# Patient Record
Sex: FEMALE | Race: ASIAN | NOT HISPANIC OR LATINO | URBAN - METROPOLITAN AREA
[De-identification: names, ages, dates, MRNs, and addresses within clinical notes are randomized per-mention and may not be internally consistent; named-entity substitution may affect disease eponyms.]

---

## 2019-05-08 ENCOUNTER — INPATIENT (INPATIENT)
Facility: HOSPITAL | Age: 24
LOS: 1 days | Discharge: HOME | End: 2019-05-10
Attending: OBSTETRICS & GYNECOLOGY | Admitting: OBSTETRICS & GYNECOLOGY
Payer: MEDICAID

## 2019-05-08 VITALS
RESPIRATION RATE: 16 BRPM | HEART RATE: 105 BPM | SYSTOLIC BLOOD PRESSURE: 122 MMHG | DIASTOLIC BLOOD PRESSURE: 65 MMHG | OXYGEN SATURATION: 100 % | TEMPERATURE: 98 F

## 2019-05-08 DIAGNOSIS — Z3A.00 WEEKS OF GESTATION OF PREGNANCY NOT SPECIFIED: ICD-10-CM

## 2019-05-08 DIAGNOSIS — Z34.80 ENCOUNTER FOR SUPERVISION OF OTHER NORMAL PREGNANCY, UNSPECIFIED TRIMESTER: ICD-10-CM

## 2019-05-08 DIAGNOSIS — O26.899 OTHER SPECIFIED PREGNANCY RELATED CONDITIONS, UNSPECIFIED TRIMESTER: ICD-10-CM

## 2019-05-08 LAB
APTT BLD: 27.2 SEC — LOW (ref 27.5–36.3)
BASOPHILS # BLD AUTO: 0.03 K/UL — SIGNIFICANT CHANGE UP (ref 0–0.2)
BASOPHILS NFR BLD AUTO: 0.2 % — SIGNIFICANT CHANGE UP (ref 0–2)
EOSINOPHIL # BLD AUTO: 0.03 K/UL — SIGNIFICANT CHANGE UP (ref 0–0.5)
EOSINOPHIL NFR BLD AUTO: 0.2 % — SIGNIFICANT CHANGE UP (ref 0–6)
FIBRINOGEN PPP-MCNC: 517 MG/DL — HIGH (ref 350–510)
HCT VFR BLD CALC: 34.6 % — SIGNIFICANT CHANGE UP (ref 34.5–45)
HGB BLD-MCNC: 10.3 G/DL — LOW (ref 11.5–15.5)
IMM GRANULOCYTES NFR BLD AUTO: 0.4 % — SIGNIFICANT CHANGE UP (ref 0–1.5)
INR BLD: 0.92 RATIO — SIGNIFICANT CHANGE UP (ref 0.88–1.16)
LYMPHOCYTES # BLD AUTO: 2.55 K/UL — SIGNIFICANT CHANGE UP (ref 1–3.3)
LYMPHOCYTES # BLD AUTO: 21 % — SIGNIFICANT CHANGE UP (ref 13–44)
MCHC RBC-ENTMCNC: 22.1 PG — LOW (ref 27–34)
MCHC RBC-ENTMCNC: 29.8 GM/DL — LOW (ref 32–36)
MCV RBC AUTO: 74.1 FL — LOW (ref 80–100)
MONOCYTES # BLD AUTO: 0.71 K/UL — SIGNIFICANT CHANGE UP (ref 0–0.9)
MONOCYTES NFR BLD AUTO: 5.9 % — SIGNIFICANT CHANGE UP (ref 2–14)
NEUTROPHILS # BLD AUTO: 8.76 K/UL — HIGH (ref 1.8–7.4)
NEUTROPHILS NFR BLD AUTO: 72.3 % — SIGNIFICANT CHANGE UP (ref 43–77)
NRBC # BLD: 0 /100 WBCS — SIGNIFICANT CHANGE UP (ref 0–0)
PLATELET # BLD AUTO: 304 K/UL — SIGNIFICANT CHANGE UP (ref 150–400)
PROTHROM AB SERPL-ACNC: 10.2 SEC — SIGNIFICANT CHANGE UP (ref 10–12.9)
RBC # BLD: 4.67 M/UL — SIGNIFICANT CHANGE UP (ref 3.8–5.2)
RBC # FLD: 17.6 % — HIGH (ref 10.3–14.5)
WBC # BLD: 12.13 K/UL — HIGH (ref 3.8–10.5)
WBC # FLD AUTO: 12.13 K/UL — HIGH (ref 3.8–10.5)

## 2019-05-08 PROCEDURE — 99282 EMERGENCY DEPT VISIT SF MDM: CPT

## 2019-05-08 RX ORDER — TETANUS TOXOID, REDUCED DIPHTHERIA TOXOID AND ACELLULAR PERTUSSIS VACCINE, ADSORBED 5; 2.5; 8; 8; 2.5 [IU]/.5ML; [IU]/.5ML; UG/.5ML; UG/.5ML; UG/.5ML
0.5 SUSPENSION INTRAMUSCULAR ONCE
Qty: 0 | Refills: 0 | Status: DISCONTINUED | OUTPATIENT
Start: 2019-05-08 | End: 2019-05-10

## 2019-05-08 RX ORDER — CITRIC ACID/SODIUM CITRATE 300-500 MG
15 SOLUTION, ORAL ORAL EVERY 6 HOURS
Qty: 0 | Refills: 0 | Status: DISCONTINUED | OUTPATIENT
Start: 2019-05-08 | End: 2019-05-08

## 2019-05-08 RX ORDER — SODIUM CHLORIDE 9 MG/ML
1000 INJECTION, SOLUTION INTRAVENOUS
Qty: 0 | Refills: 0 | Status: DISCONTINUED | OUTPATIENT
Start: 2019-05-08 | End: 2019-05-08

## 2019-05-08 RX ORDER — DOCUSATE SODIUM 100 MG
100 CAPSULE ORAL
Qty: 0 | Refills: 0 | Status: DISCONTINUED | OUTPATIENT
Start: 2019-05-08 | End: 2019-05-10

## 2019-05-08 RX ORDER — HYDROCORTISONE 1 %
1 OINTMENT (GRAM) TOPICAL EVERY 6 HOURS
Qty: 0 | Refills: 0 | Status: DISCONTINUED | OUTPATIENT
Start: 2019-05-08 | End: 2019-05-10

## 2019-05-08 RX ORDER — LANOLIN
1 OINTMENT (GRAM) TOPICAL EVERY 6 HOURS
Qty: 0 | Refills: 0 | Status: DISCONTINUED | OUTPATIENT
Start: 2019-05-08 | End: 2019-05-10

## 2019-05-08 RX ORDER — SODIUM CHLORIDE 9 MG/ML
3 INJECTION INTRAMUSCULAR; INTRAVENOUS; SUBCUTANEOUS EVERY 8 HOURS
Qty: 0 | Refills: 0 | Status: DISCONTINUED | OUTPATIENT
Start: 2019-05-08 | End: 2019-05-10

## 2019-05-08 RX ORDER — IBUPROFEN 200 MG
600 TABLET ORAL EVERY 6 HOURS
Qty: 0 | Refills: 0 | Status: DISCONTINUED | OUTPATIENT
Start: 2019-05-08 | End: 2019-05-10

## 2019-05-08 RX ORDER — SIMETHICONE 80 MG/1
80 TABLET, CHEWABLE ORAL EVERY 4 HOURS
Qty: 0 | Refills: 0 | Status: DISCONTINUED | OUTPATIENT
Start: 2019-05-08 | End: 2019-05-10

## 2019-05-08 RX ORDER — OXYTOCIN 10 UNIT/ML
333.33 VIAL (ML) INJECTION
Qty: 20 | Refills: 0 | Status: DISCONTINUED | OUTPATIENT
Start: 2019-05-08 | End: 2019-05-10

## 2019-05-08 RX ORDER — DIBUCAINE 1 %
1 OINTMENT (GRAM) RECTAL EVERY 6 HOURS
Qty: 0 | Refills: 0 | Status: DISCONTINUED | OUTPATIENT
Start: 2019-05-08 | End: 2019-05-10

## 2019-05-08 RX ORDER — OXYCODONE HYDROCHLORIDE 5 MG/1
5 TABLET ORAL EVERY 4 HOURS
Qty: 0 | Refills: 0 | Status: DISCONTINUED | OUTPATIENT
Start: 2019-05-08 | End: 2019-05-10

## 2019-05-08 RX ORDER — MAGNESIUM HYDROXIDE 400 MG/1
30 TABLET, CHEWABLE ORAL
Qty: 0 | Refills: 0 | Status: DISCONTINUED | OUTPATIENT
Start: 2019-05-08 | End: 2019-05-10

## 2019-05-08 RX ORDER — OXYTOCIN 10 UNIT/ML
333.33 VIAL (ML) INJECTION
Qty: 20 | Refills: 0 | Status: DISCONTINUED | OUTPATIENT
Start: 2019-05-08 | End: 2019-05-08

## 2019-05-08 RX ORDER — GLYCERIN ADULT
1 SUPPOSITORY, RECTAL RECTAL AT BEDTIME
Qty: 0 | Refills: 0 | Status: DISCONTINUED | OUTPATIENT
Start: 2019-05-08 | End: 2019-05-10

## 2019-05-08 RX ORDER — AER TRAVELER 0.5 G/1
1 SOLUTION RECTAL; TOPICAL EVERY 4 HOURS
Qty: 0 | Refills: 0 | Status: DISCONTINUED | OUTPATIENT
Start: 2019-05-08 | End: 2019-05-10

## 2019-05-08 RX ORDER — PRAMOXINE HYDROCHLORIDE 150 MG/15G
1 AEROSOL, FOAM RECTAL EVERY 4 HOURS
Qty: 0 | Refills: 0 | Status: DISCONTINUED | OUTPATIENT
Start: 2019-05-08 | End: 2019-05-10

## 2019-05-08 RX ORDER — BENZOCAINE 10 %
1 GEL (GRAM) MUCOUS MEMBRANE EVERY 6 HOURS
Qty: 0 | Refills: 0 | Status: DISCONTINUED | OUTPATIENT
Start: 2019-05-08 | End: 2019-05-10

## 2019-05-08 RX ORDER — DIPHENHYDRAMINE HCL 50 MG
25 CAPSULE ORAL EVERY 6 HOURS
Qty: 0 | Refills: 0 | Status: DISCONTINUED | OUTPATIENT
Start: 2019-05-08 | End: 2019-05-10

## 2019-05-08 RX ADMIN — Medication 600 MILLIGRAM(S): at 17:56

## 2019-05-08 RX ADMIN — SIMETHICONE 80 MILLIGRAM(S): 80 TABLET, CHEWABLE ORAL at 22:44

## 2019-05-08 RX ADMIN — Medication 600 MILLIGRAM(S): at 17:00

## 2019-05-08 RX ADMIN — Medication 600 MILLIGRAM(S): at 22:45

## 2019-05-08 RX ADMIN — SODIUM CHLORIDE 3 MILLILITER(S): 9 INJECTION INTRAMUSCULAR; INTRAVENOUS; SUBCUTANEOUS at 22:08

## 2019-05-08 RX ADMIN — Medication 100 MILLIGRAM(S): at 22:45

## 2019-05-08 RX ADMIN — Medication 1000 MILLIUNIT(S)/MIN: at 17:20

## 2019-05-09 DIAGNOSIS — D50.0 IRON DEFICIENCY ANEMIA SECONDARY TO BLOOD LOSS (CHRONIC): ICD-10-CM

## 2019-05-09 LAB
ABO RH CONFIRMATION: SIGNIFICANT CHANGE UP
BASOPHILS # BLD AUTO: 0.02 K/UL — SIGNIFICANT CHANGE UP (ref 0–0.2)
BASOPHILS # BLD AUTO: 0.04 K/UL — SIGNIFICANT CHANGE UP (ref 0–0.2)
BASOPHILS NFR BLD AUTO: 0.2 % — SIGNIFICANT CHANGE UP (ref 0–2)
BASOPHILS NFR BLD AUTO: 0.3 % — SIGNIFICANT CHANGE UP (ref 0–2)
EOSINOPHIL # BLD AUTO: 0.01 K/UL — SIGNIFICANT CHANGE UP (ref 0–0.5)
EOSINOPHIL # BLD AUTO: 0.1 K/UL — SIGNIFICANT CHANGE UP (ref 0–0.5)
EOSINOPHIL NFR BLD AUTO: 0.1 % — SIGNIFICANT CHANGE UP (ref 0–6)
EOSINOPHIL NFR BLD AUTO: 0.8 % — SIGNIFICANT CHANGE UP (ref 0–6)
HCT VFR BLD CALC: 23.2 % — LOW (ref 34.5–45)
HCT VFR BLD CALC: 31.6 % — LOW (ref 34.5–45)
HGB BLD-MCNC: 6.9 G/DL — CRITICAL LOW (ref 11.5–15.5)
HGB BLD-MCNC: 9.9 G/DL — LOW (ref 11.5–15.5)
IMM GRANULOCYTES NFR BLD AUTO: 0.4 % — SIGNIFICANT CHANGE UP (ref 0–1.5)
IMM GRANULOCYTES NFR BLD AUTO: 1.2 % — SIGNIFICANT CHANGE UP (ref 0–1.5)
LYMPHOCYTES # BLD AUTO: 19.1 % — SIGNIFICANT CHANGE UP (ref 13–44)
LYMPHOCYTES # BLD AUTO: 2.05 K/UL — SIGNIFICANT CHANGE UP (ref 1–3.3)
LYMPHOCYTES # BLD AUTO: 2.6 K/UL — SIGNIFICANT CHANGE UP (ref 1–3.3)
LYMPHOCYTES # BLD AUTO: 21.9 % — SIGNIFICANT CHANGE UP (ref 13–44)
MCHC RBC-ENTMCNC: 22.3 PG — LOW (ref 27–34)
MCHC RBC-ENTMCNC: 23.6 PG — LOW (ref 27–34)
MCHC RBC-ENTMCNC: 29.7 GM/DL — LOW (ref 32–36)
MCHC RBC-ENTMCNC: 31.3 GM/DL — LOW (ref 32–36)
MCV RBC AUTO: 74.8 FL — LOW (ref 80–100)
MCV RBC AUTO: 75.4 FL — LOW (ref 80–100)
MONOCYTES # BLD AUTO: 0.76 K/UL — SIGNIFICANT CHANGE UP (ref 0–0.9)
MONOCYTES # BLD AUTO: 0.93 K/UL — HIGH (ref 0–0.9)
MONOCYTES NFR BLD AUTO: 7.1 % — SIGNIFICANT CHANGE UP (ref 2–14)
MONOCYTES NFR BLD AUTO: 7.8 % — SIGNIFICANT CHANGE UP (ref 2–14)
NEUTROPHILS # BLD AUTO: 7.88 K/UL — HIGH (ref 1.8–7.4)
NEUTROPHILS # BLD AUTO: 8.05 K/UL — HIGH (ref 1.8–7.4)
NEUTROPHILS NFR BLD AUTO: 68 % — SIGNIFICANT CHANGE UP (ref 43–77)
NEUTROPHILS NFR BLD AUTO: 73.1 % — SIGNIFICANT CHANGE UP (ref 43–77)
NRBC # BLD: 0 /100 WBCS — SIGNIFICANT CHANGE UP (ref 0–0)
NRBC # BLD: 0 /100 WBCS — SIGNIFICANT CHANGE UP (ref 0–0)
PLATELET # BLD AUTO: 252 K/UL — SIGNIFICANT CHANGE UP (ref 150–400)
PLATELET # BLD AUTO: 300 K/UL — SIGNIFICANT CHANGE UP (ref 150–400)
RBC # BLD: 3.1 M/UL — LOW (ref 3.8–5.2)
RBC # BLD: 4.19 M/UL — SIGNIFICANT CHANGE UP (ref 3.8–5.2)
RBC # FLD: 17.4 % — HIGH (ref 10.3–14.5)
RBC # FLD: 19.3 % — HIGH (ref 10.3–14.5)
T PALLIDUM AB TITR SER: NEGATIVE — SIGNIFICANT CHANGE UP
WBC # BLD: 10.76 K/UL — HIGH (ref 3.8–10.5)
WBC # BLD: 11.86 K/UL — HIGH (ref 3.8–10.5)
WBC # FLD AUTO: 10.76 K/UL — HIGH (ref 3.8–10.5)
WBC # FLD AUTO: 11.86 K/UL — HIGH (ref 3.8–10.5)

## 2019-05-09 RX ORDER — FERROUS SULFATE 325(65) MG
325 TABLET ORAL THREE TIMES A DAY
Refills: 0 | Status: DISCONTINUED | OUTPATIENT
Start: 2019-05-09 | End: 2019-05-10

## 2019-05-09 RX ORDER — ASCORBIC ACID 60 MG
500 TABLET,CHEWABLE ORAL DAILY
Refills: 0 | Status: DISCONTINUED | OUTPATIENT
Start: 2019-05-09 | End: 2019-05-10

## 2019-05-09 RX ADMIN — Medication 325 MILLIGRAM(S): at 22:20

## 2019-05-09 RX ADMIN — MAGNESIUM HYDROXIDE 30 MILLILITER(S): 400 TABLET, CHEWABLE ORAL at 06:29

## 2019-05-09 RX ADMIN — Medication 500 MILLIGRAM(S): at 13:36

## 2019-05-09 RX ADMIN — SIMETHICONE 80 MILLIGRAM(S): 80 TABLET, CHEWABLE ORAL at 06:28

## 2019-05-09 RX ADMIN — Medication 325 MILLIGRAM(S): at 13:37

## 2019-05-09 RX ADMIN — Medication 1 TABLET(S): at 13:36

## 2019-05-09 RX ADMIN — SODIUM CHLORIDE 3 MILLILITER(S): 9 INJECTION INTRAMUSCULAR; INTRAVENOUS; SUBCUTANEOUS at 06:30

## 2019-05-09 RX ADMIN — Medication 100 MILLIGRAM(S): at 06:29

## 2019-05-09 RX ADMIN — SODIUM CHLORIDE 3 MILLILITER(S): 9 INJECTION INTRAMUSCULAR; INTRAVENOUS; SUBCUTANEOUS at 13:38

## 2019-05-09 RX ADMIN — Medication 600 MILLIGRAM(S): at 22:52

## 2019-05-09 RX ADMIN — Medication 600 MILLIGRAM(S): at 06:29

## 2019-05-09 RX ADMIN — Medication 600 MILLIGRAM(S): at 13:37

## 2019-05-09 RX ADMIN — Medication 600 MILLIGRAM(S): at 14:07

## 2019-05-09 RX ADMIN — SODIUM CHLORIDE 3 MILLILITER(S): 9 INJECTION INTRAMUSCULAR; INTRAVENOUS; SUBCUTANEOUS at 22:22

## 2019-05-09 RX ADMIN — Medication 600 MILLIGRAM(S): at 22:22

## 2019-05-09 NOTE — DISCHARGE NOTE OB - MEDICATION SUMMARY - MEDICATIONS TO TAKE
I will START or STAY ON the medications listed below when I get home from the hospital:    ibuprofen 600 mg oral tablet  -- 1 tab(s) by mouth every 6 hours, As needed, Pain   -- Indication: For pain    Prenatal Multivitamins with Folic Acid 1 mg oral tablet  -- 1 tab(s) by mouth once a day  -- Indication: For Supplmentation while breast feeding    ferrous sulfate 325 mg (65 mg elemental iron) oral tablet  -- 1 tab(s) by mouth 2 times a day   -- Check with your doctor before becoming pregnant.  Do not chew, break, or crush.  May discolor urine or feces.    -- Indication: For Supplmentation    docusate sodium 100 mg oral capsule  -- 1 cap(s) by mouth 2 times a day, As needed, For stool softening  -- Indication: For Stool softening     Vitamin C 500 mg oral tablet, chewable  -- 1 tab(s) chewed once a day   -- Chew tablets before swallowing    -- Indication: For Supplementation

## 2019-05-09 NOTE — DISCHARGE NOTE OB - MATERIALS PROVIDED
Guide to Postpartum Care/Birth Certificate Instructions/Discharge Medication Information for Patients and Families Pocket Guide/  Immunization Record/Shaken Baby Prevention Handout/Breastfeeding Guide and Packet/Vaccinations

## 2019-05-09 NOTE — PROGRESS NOTE ADULT - SUBJECTIVE AND OBJECTIVE BOX
Patient seen at bedside resting comfortably offers no current complaints. Ambulating and voiding without difficulty.  Passing flatus and tolerating regular diet.  both breast/bottle feeding . Denies HA, CP, SOB, N/V/D, dizziness, palpitations, worsening abdominal pain, worsening vaginal bleeding, or any other concerns.    Vital Signs Last 24 Hrs  T(C): 36.4 (09 May 2019 09:08), Max: 36.8 (08 May 2019 15:45)  T(F): 97.5 (09 May 2019 09:08), Max: 98.2 (08 May 2019 15:45)  HR: 102 (09 May 2019 09:08) (66 - 105)  BP: 125/72 (09 May 2019 09:08) (92/52 - 125/72)  BP(mean): --  RR: 16 (09 May 2019 09:08) (16 - 22)  SpO2: 99% (09 May 2019 09:08) (99% - 100%)    Physical Exam:     Gen: A&Ox 3, NAD  Chest: CTA B/L  Cardiac: S1+S2; RRR  Breast: Soft, nontender, nonengorged  Abdomen: +Bs; Soft, nontender,  ND; Fundus firm below umbilicus  Gyn: mod lochia, intact/repaired  Ext: Nontender, DTRS 2+, no worsening edema                          6.9    10.76 )-----------( 252      ( 09 May 2019 06:11 )             23.2       A/P: 23 year old PPD#1 s/p , acute blood loss anemia    -orthostatics positive  -transfusing 2units PRBC   -Continue pain management  -Encourage OOB and ambulation  -Check post transfusion CBC  -Continue current care  -Plan for discharge tomorrow  -d/w dr. johnson

## 2019-05-09 NOTE — DISCHARGE NOTE OB - CARE PLAN
Principal Discharge DX:	Normal spontaneous vaginal delivery  Goal:	pain management, encourage breast feeding  Assessment and plan of treatment:	Continue prenatal vitamins while breastfeeding. Pelvic rest.  No sexual intercourse and  nothing in vagina - tampons, douching.  No heavy lifting or no strenuous activity.  Motrin as needed for pain. Follow up in office in 5-6 weeks for post partum check up. Please call for appt.  Secondary Diagnosis:	Anemia due to blood loss  Goal:	s/p 2 units prbcs  Assessment and plan of treatment:	take iron, folic acid, vitamin C, and prenatal vitamins. eat iron fortified food. Please take iron tablets three times daily. Return if any dizziness, shortness of breath, palpitations, chest pain or any other acute symptoms.

## 2019-05-09 NOTE — PROGRESS NOTE ADULT - PROBLEM SELECTOR PLAN 2
-orthostatics positive  -transfusing 2units PRBC   -Continue pain management  -Encourage OOB and ambulation  -Check post transfusion CBC  -Continue current care  -Plan for discharge tomorrow  -d/w dr. johnson

## 2019-05-09 NOTE — DISCHARGE NOTE OB - HOSPITAL COURSE
patient admitted in labor  normal spontaneous vaginal delivery  acute blood loss anemia noted  2 unit prbcs given  patient discharged home in stable condition

## 2019-05-09 NOTE — DISCHARGE NOTE OB - CARE PROVIDER_API CALL
Mervin Villanueva (DO)  Obstetrics  Gynecology  [address], [address]  [place] [address]  Phone: [phone]  Fax: [phone]  Follow Up Time:

## 2019-05-09 NOTE — DISCHARGE NOTE OB - ADDITIONAL INSTRUCTIONS
Continue prenatal vitamins while breastfeeding. Pelvic rest.  No sexual intercourse and  nothing in vagina - tampons, douching.  No heavy lifting or no strenuous activity.  Motrin as needed for pain. Follow up in office in 5-6 weeks for post partum check up. Please call for appt.  take iron, folic acid, vitamin C, and prenatal vitamins. eat iron fortified food. Please take iron tablets three times daily. Return if any dizziness, shortness of breath, palpitations, chest pain or any other acute symptoms.

## 2019-05-09 NOTE — CHART NOTE - NSCHARTNOTEFT_GEN_A_CORE
Lab notified of low H:H   Complete Blood Count + Automated Diff in AM (19 @ 06:11)    WBC Count: 10.76 K/uL    RBC Count: 3.10 M/uL    Hemoglobin: 6.9: TYPE:(C=Critical, N=Notification, A=Abnormal) C  TESTS: HGB  DATE/TIME CALLED: 19 06:35  CALLED TO: GENE LOUIS  READ BACK (2 Patient Identifiers)(Y/N): Y  READ BACK VALUES (Y/N): Y  CALLED BY: KERRI g/dL    Hematocrit: 23.2 %    Mean Cell Volume: 74.8 fl    Mean Cell Hemoglobin: 22.3 pg    Mean Cell Hemoglobin Conc: 29.7 gm/dL    Red Cell Distrib Width: 17.4 %    Platelet Count - Automated: 252 K/uL    Auto Neutrophil #: 7.88 K/uL    Auto Lymphocyte #: 2.05 K/uL    Auto Monocyte #: 0.76 K/uL    Auto Eosinophil #: 0.01 K/uL    Auto Basophil #: 0.02 K/uL    Auto Neutrophil %: 73.1: Differential percentages must be correlated with absolute numbers for  clinical significance. %    Auto Lymphocyte %: 19.1 %    Auto Monocyte %: 7.1 %    Auto Eosinophil %: 0.1 %    Auto Basophil %: 0.2 %    Auto Immature Granulocyte %: 0.4 %    Nucleated RBC: 0 /100 WBCs    Pt seen at bedside states feels dizzy upon ambulation, denies cp, sob or palpitations    orthostatic vs /62 P 83, /70 P 99, /97 P 110    Gen: A&O x3; NAD, pale appearing  card: RRR  abd: soft/nt, fundus firm  pelvic: mod lochia    a/p s/p PPD#1 s/p  w chronic on acute blood loss anemia; symptomatic  2units PRBC ordered  cont pp care  consent obtained pt understands and agrees w plan  d/w dr Villanueva

## 2019-05-09 NOTE — DISCHARGE NOTE OB - PATIENT PORTAL LINK FT
You can access the [facility] Patient Portal, offered by [facility], by registering with the following website: [email or web address]

## 2019-05-09 NOTE — DISCHARGE NOTE OB - PLAN OF CARE
pain management, encourage breast feeding Continue prenatal vitamins while breastfeeding. Pelvic rest.  No sexual intercourse and  nothing in vagina - tampons, douching.  No heavy lifting or no strenuous activity.  Motrin as needed for pain. Follow up in office in 5-6 weeks for post partum check up. Please call for appt. s/p 2 units prbcs take iron, folic acid, vitamin C, and prenatal vitamins. eat iron fortified food. Please take iron tablets three times daily. Return if any dizziness, shortness of breath, palpitations, chest pain or any other acute symptoms.

## 2019-05-10 VITALS
HEART RATE: 87 BPM | SYSTOLIC BLOOD PRESSURE: 128 MMHG | OXYGEN SATURATION: 98 % | TEMPERATURE: 98 F | RESPIRATION RATE: 18 BRPM | DIASTOLIC BLOOD PRESSURE: 75 MMHG

## 2019-05-10 LAB
BASOPHILS # BLD AUTO: 0.04 K/UL — SIGNIFICANT CHANGE UP (ref 0–0.2)
BASOPHILS NFR BLD AUTO: 0.4 % — SIGNIFICANT CHANGE UP (ref 0–2)
EOSINOPHIL # BLD AUTO: 0.28 K/UL — SIGNIFICANT CHANGE UP (ref 0–0.5)
EOSINOPHIL NFR BLD AUTO: 2.6 % — SIGNIFICANT CHANGE UP (ref 0–6)
HCT VFR BLD CALC: 32 % — LOW (ref 34.5–45)
HGB BLD-MCNC: 10.1 G/DL — LOW (ref 11.5–15.5)
IMM GRANULOCYTES NFR BLD AUTO: 1 % — SIGNIFICANT CHANGE UP (ref 0–1.5)
LYMPHOCYTES # BLD AUTO: 2.58 K/UL — SIGNIFICANT CHANGE UP (ref 1–3.3)
LYMPHOCYTES # BLD AUTO: 24 % — SIGNIFICANT CHANGE UP (ref 13–44)
MCHC RBC-ENTMCNC: 23.8 PG — LOW (ref 27–34)
MCHC RBC-ENTMCNC: 31.6 GM/DL — LOW (ref 32–36)
MCV RBC AUTO: 75.3 FL — LOW (ref 80–100)
MONOCYTES # BLD AUTO: 0.8 K/UL — SIGNIFICANT CHANGE UP (ref 0–0.9)
MONOCYTES NFR BLD AUTO: 7.5 % — SIGNIFICANT CHANGE UP (ref 2–14)
NEUTROPHILS # BLD AUTO: 6.92 K/UL — SIGNIFICANT CHANGE UP (ref 1.8–7.4)
NEUTROPHILS NFR BLD AUTO: 64.5 % — SIGNIFICANT CHANGE UP (ref 43–77)
NRBC # BLD: 0 /100 WBCS — SIGNIFICANT CHANGE UP (ref 0–0)
PLATELET # BLD AUTO: 310 K/UL — SIGNIFICANT CHANGE UP (ref 150–400)
RBC # BLD: 4.25 M/UL — SIGNIFICANT CHANGE UP (ref 3.8–5.2)
RBC # FLD: 19 % — HIGH (ref 10.3–14.5)
WBC # BLD: 10.73 K/UL — HIGH (ref 3.8–10.5)
WBC # FLD AUTO: 10.73 K/UL — HIGH (ref 3.8–10.5)

## 2019-05-10 RX ORDER — IBUPROFEN 200 MG
1 TABLET ORAL
Qty: 20 | Refills: 0
Start: 2019-05-10 | End: 2019-05-14

## 2019-05-10 RX ORDER — FERROUS SULFATE 325(65) MG
1 TABLET ORAL
Qty: 60 | Refills: 0
Start: 2019-05-10 | End: 2019-06-08

## 2019-05-10 RX ORDER — ASCORBIC ACID 60 MG
1 TABLET,CHEWABLE ORAL
Qty: 30 | Refills: 0
Start: 2019-05-10 | End: 2019-06-08

## 2019-05-10 RX ORDER — DOCUSATE SODIUM 100 MG
1 CAPSULE ORAL
Qty: 60 | Refills: 0
Start: 2019-05-10 | End: 2019-06-08

## 2019-05-10 RX ADMIN — Medication 600 MILLIGRAM(S): at 06:32

## 2019-05-10 RX ADMIN — Medication 600 MILLIGRAM(S): at 11:55

## 2019-05-10 RX ADMIN — SODIUM CHLORIDE 3 MILLILITER(S): 9 INJECTION INTRAMUSCULAR; INTRAVENOUS; SUBCUTANEOUS at 06:08

## 2019-05-10 RX ADMIN — Medication 1 TABLET(S): at 11:54

## 2019-05-10 RX ADMIN — Medication 600 MILLIGRAM(S): at 12:03

## 2019-05-10 RX ADMIN — Medication 325 MILLIGRAM(S): at 06:09

## 2019-05-10 RX ADMIN — Medication 600 MILLIGRAM(S): at 06:12

## 2019-05-10 NOTE — PROGRESS NOTE ADULT - ASSESSMENT
A/P:  23y F Postpartum day two s/p  @ 39 weeks, acute blood loss anemia, s/p 2 units prbcs, currently in stable condition  -d/c home  -Discharge instructions given. Patient verbalize understanding  d/w Dr Villanueva

## 2019-05-10 NOTE — PROGRESS NOTE ADULT - SUBJECTIVE AND OBJECTIVE BOX
OBGYN PA NOTE PPD2  Patient is [employer] speaking. HARPER Trixie served as . Patient seen and evaluated at bedside,  resting comfortably w/o any acute  complaints.  Denies fever, HA, CP, SOB, N/V/D, dizziness, palpitations, worsening abdominal pain, worsening vaginal bleeding, or any other concerns.  Ambulating and voiding without difficulty.  Passing flatus and tolerating regular diet.  Attempting to breastfeed.     Vital Signs Last 24 Hrs  T(C): 36.6 (10 May 2019 04:45), Max: 37.1 (09 May 2019 14:10)  T(F): 97.9 (10 May 2019 04:45), Max: 98.7 (09 May 2019 14:10)  HR: 87 (10 May 2019 04:45) (79 - 105)  BP: 128/75 (10 May 2019 04:45) (108/62 - 128/75)  BP(mean): 89 (09 May 2019 17:37) (89 - 89)  RR: 18 (10 May 2019 04:45) (16 - 18)  SpO2: 98% (10 May 2019 04:45) (96% - 99%)    Physical Exam:     Gen: A&Ox 3, NAD  Chest: CTAB/L  Cardiac: S1+S2+ RRR  Breast: Soft, nontender, nonengorged  Abdomen: Soft, nontender, Fundus firm below umbilicus, +BS  Gyn: Mild lochia, intact/repaired  Extremities: Nontender, DTRS 2+, no worsening edema                          10.1   10.73 )-----------( 310      ( 10 May 2019 06:50 )             32.0     A/P:  23y F Postpartum day two s/p  @ 39 weeks, acute blood loss anemia, s/p 2 units prbcs, currently in stable condition  -d/c home  -Discharge instructions given. Patient verbalize understanding  d/w Dr Villanueva

## 2019-07-10 PROCEDURE — 86923 COMPATIBILITY TEST ELECTRIC: CPT

## 2019-07-10 PROCEDURE — 36430 TRANSFUSION BLD/BLD COMPNT: CPT

## 2019-07-10 PROCEDURE — 59050 FETAL MONITOR W/REPORT: CPT

## 2019-07-10 PROCEDURE — 85610 PROTHROMBIN TIME: CPT

## 2019-07-10 PROCEDURE — P9040: CPT

## 2019-07-10 PROCEDURE — 85384 FIBRINOGEN ACTIVITY: CPT

## 2019-07-10 PROCEDURE — 85027 COMPLETE CBC AUTOMATED: CPT

## 2019-07-10 PROCEDURE — 86901 BLOOD TYPING SEROLOGIC RH(D): CPT

## 2019-07-10 PROCEDURE — 85730 THROMBOPLASTIN TIME PARTIAL: CPT

## 2019-07-10 PROCEDURE — 86780 TREPONEMA PALLIDUM: CPT

## 2019-07-10 PROCEDURE — 36415 COLL VENOUS BLD VENIPUNCTURE: CPT

## 2019-07-10 PROCEDURE — 59025 FETAL NON-STRESS TEST: CPT

## 2019-07-10 PROCEDURE — 86850 RBC ANTIBODY SCREEN: CPT

## 2019-07-10 PROCEDURE — 86900 BLOOD TYPING SEROLOGIC ABO: CPT

## 2019-07-10 PROCEDURE — G0463: CPT

## 2020-08-01 ENCOUNTER — INPATIENT (INPATIENT)
Facility: HOSPITAL | Age: 25
LOS: 3 days | Discharge: HOME | DRG: 872 | End: 2020-08-05
Attending: INTERNAL MEDICINE | Admitting: INTERNAL MEDICINE
Payer: MEDICAID

## 2020-08-01 VITALS
WEIGHT: 105.82 LBS | SYSTOLIC BLOOD PRESSURE: 103 MMHG | RESPIRATION RATE: 16 BRPM | TEMPERATURE: 103 F | HEART RATE: 101 BPM | DIASTOLIC BLOOD PRESSURE: 78 MMHG | OXYGEN SATURATION: 99 % | HEIGHT: 62 IN

## 2020-08-01 DIAGNOSIS — N12 TUBULO-INTERSTITIAL NEPHRITIS, NOT SPECIFIED AS ACUTE OR CHRONIC: ICD-10-CM

## 2020-08-01 PROBLEM — Z78.9 OTHER SPECIFIED HEALTH STATUS: Chronic | Status: ACTIVE | Noted: 2019-05-08

## 2020-08-01 LAB
ALBUMIN SERPL ELPH-MCNC: 2.9 G/DL — LOW (ref 3.5–5)
ALP SERPL-CCNC: 161 U/L — HIGH (ref 40–120)
ALT FLD-CCNC: 56 U/L DA — SIGNIFICANT CHANGE UP (ref 10–60)
ANION GAP SERPL CALC-SCNC: 8 MMOL/L — SIGNIFICANT CHANGE UP (ref 5–17)
APPEARANCE UR: CLEAR — SIGNIFICANT CHANGE UP
APTT BLD: 35.6 SEC — HIGH (ref 27.5–35.5)
AST SERPL-CCNC: 37 U/L — SIGNIFICANT CHANGE UP (ref 10–40)
BASOPHILS # BLD AUTO: 0.01 K/UL — SIGNIFICANT CHANGE UP (ref 0–0.2)
BASOPHILS NFR BLD AUTO: 0.2 % — SIGNIFICANT CHANGE UP (ref 0–2)
BILIRUB SERPL-MCNC: 0.5 MG/DL — SIGNIFICANT CHANGE UP (ref 0.2–1.2)
BILIRUB UR-MCNC: NEGATIVE — SIGNIFICANT CHANGE UP
BUN SERPL-MCNC: 5 MG/DL — LOW (ref 7–18)
CALCIUM SERPL-MCNC: 8.2 MG/DL — LOW (ref 8.4–10.5)
CHLORIDE SERPL-SCNC: 103 MMOL/L — SIGNIFICANT CHANGE UP (ref 96–108)
CO2 SERPL-SCNC: 26 MMOL/L — SIGNIFICANT CHANGE UP (ref 22–31)
COLOR SPEC: YELLOW — SIGNIFICANT CHANGE UP
CREAT SERPL-MCNC: 0.55 MG/DL — SIGNIFICANT CHANGE UP (ref 0.5–1.3)
DIFF PNL FLD: ABNORMAL
EOSINOPHIL # BLD AUTO: 0 K/UL — SIGNIFICANT CHANGE UP (ref 0–0.5)
EOSINOPHIL NFR BLD AUTO: 0 % — SIGNIFICANT CHANGE UP (ref 0–6)
GLUCOSE SERPL-MCNC: 104 MG/DL — HIGH (ref 70–99)
GLUCOSE UR QL: NEGATIVE — SIGNIFICANT CHANGE UP
HCG SERPL-ACNC: <1 MIU/ML — SIGNIFICANT CHANGE UP
HCT VFR BLD CALC: 30.6 % — LOW (ref 34.5–45)
HGB BLD-MCNC: 9.3 G/DL — LOW (ref 11.5–15.5)
IMM GRANULOCYTES NFR BLD AUTO: 0.4 % — SIGNIFICANT CHANGE UP (ref 0–1.5)
INR BLD: 1.22 RATIO — HIGH (ref 0.88–1.16)
KETONES UR-MCNC: NEGATIVE — SIGNIFICANT CHANGE UP
LACTATE SERPL-SCNC: 1.1 MMOL/L — SIGNIFICANT CHANGE UP (ref 0.7–2)
LEUKOCYTE ESTERASE UR-ACNC: ABNORMAL
LYMPHOCYTES # BLD AUTO: 0.68 K/UL — LOW (ref 1–3.3)
LYMPHOCYTES # BLD AUTO: 13.5 % — SIGNIFICANT CHANGE UP (ref 13–44)
MCHC RBC-ENTMCNC: 22.9 PG — LOW (ref 27–34)
MCHC RBC-ENTMCNC: 30.4 GM/DL — LOW (ref 32–36)
MCV RBC AUTO: 75.4 FL — LOW (ref 80–100)
MONOCYTES # BLD AUTO: 0.77 K/UL — SIGNIFICANT CHANGE UP (ref 0–0.9)
MONOCYTES NFR BLD AUTO: 15.2 % — HIGH (ref 2–14)
NEUTROPHILS # BLD AUTO: 3.57 K/UL — SIGNIFICANT CHANGE UP (ref 1.8–7.4)
NEUTROPHILS NFR BLD AUTO: 70.7 % — SIGNIFICANT CHANGE UP (ref 43–77)
NITRITE UR-MCNC: POSITIVE
NRBC # BLD: 0 /100 WBCS — SIGNIFICANT CHANGE UP (ref 0–0)
PH UR: 6.5 — SIGNIFICANT CHANGE UP (ref 5–8)
PLATELET # BLD AUTO: 296 K/UL — SIGNIFICANT CHANGE UP (ref 150–400)
POTASSIUM SERPL-MCNC: 3 MMOL/L — LOW (ref 3.5–5.3)
POTASSIUM SERPL-SCNC: 3 MMOL/L — LOW (ref 3.5–5.3)
PROT SERPL-MCNC: 7.4 G/DL — SIGNIFICANT CHANGE UP (ref 6–8.3)
PROT UR-MCNC: 30 MG/DL
PROTHROM AB SERPL-ACNC: 14.1 SEC — HIGH (ref 10.6–13.6)
RBC # BLD: 4.06 M/UL — SIGNIFICANT CHANGE UP (ref 3.8–5.2)
RBC # FLD: 14.8 % — HIGH (ref 10.3–14.5)
SARS-COV-2 RNA SPEC QL NAA+PROBE: SIGNIFICANT CHANGE UP
SODIUM SERPL-SCNC: 137 MMOL/L — SIGNIFICANT CHANGE UP (ref 135–145)
SP GR SPEC: 1 — LOW (ref 1.01–1.02)
UROBILINOGEN FLD QL: 1
WBC # BLD: 5.05 K/UL — SIGNIFICANT CHANGE UP (ref 3.8–10.5)
WBC # FLD AUTO: 5.05 K/UL — SIGNIFICANT CHANGE UP (ref 3.8–10.5)

## 2020-08-01 PROCEDURE — 76705 ECHO EXAM OF ABDOMEN: CPT | Mod: 26

## 2020-08-01 PROCEDURE — 99223 1ST HOSP IP/OBS HIGH 75: CPT

## 2020-08-01 PROCEDURE — 71046 X-RAY EXAM CHEST 2 VIEWS: CPT | Mod: 26

## 2020-08-01 PROCEDURE — 93010 ELECTROCARDIOGRAM REPORT: CPT

## 2020-08-01 PROCEDURE — 99285 EMERGENCY DEPT VISIT HI MDM: CPT

## 2020-08-01 PROCEDURE — 74177 CT ABD & PELVIS W/CONTRAST: CPT | Mod: 26

## 2020-08-01 RX ORDER — MORPHINE SULFATE 50 MG/1
4 CAPSULE, EXTENDED RELEASE ORAL ONCE
Refills: 0 | Status: DISCONTINUED | OUTPATIENT
Start: 2020-08-01 | End: 2020-08-01

## 2020-08-01 RX ORDER — POTASSIUM CHLORIDE 20 MEQ
20 PACKET (EA) ORAL ONCE
Refills: 0 | Status: COMPLETED | OUTPATIENT
Start: 2020-08-01 | End: 2020-08-01

## 2020-08-01 RX ORDER — ENOXAPARIN SODIUM 100 MG/ML
40 INJECTION SUBCUTANEOUS DAILY
Refills: 0 | Status: DISCONTINUED | OUTPATIENT
Start: 2020-08-01 | End: 2020-08-02

## 2020-08-01 RX ORDER — ONDANSETRON 8 MG/1
4 TABLET, FILM COATED ORAL ONCE
Refills: 0 | Status: COMPLETED | OUTPATIENT
Start: 2020-08-01 | End: 2020-08-01

## 2020-08-01 RX ORDER — SODIUM CHLORIDE 9 MG/ML
1000 INJECTION INTRAMUSCULAR; INTRAVENOUS; SUBCUTANEOUS ONCE
Refills: 0 | Status: COMPLETED | OUTPATIENT
Start: 2020-08-01 | End: 2020-08-01

## 2020-08-01 RX ORDER — POTASSIUM CHLORIDE 20 MEQ
10 PACKET (EA) ORAL ONCE
Refills: 0 | Status: COMPLETED | OUTPATIENT
Start: 2020-08-01 | End: 2020-08-01

## 2020-08-01 RX ORDER — IBUPROFEN 200 MG
400 TABLET ORAL ONCE
Refills: 0 | Status: COMPLETED | OUTPATIENT
Start: 2020-08-01 | End: 2020-08-01

## 2020-08-01 RX ORDER — PIPERACILLIN AND TAZOBACTAM 4; .5 G/20ML; G/20ML
3.38 INJECTION, POWDER, LYOPHILIZED, FOR SOLUTION INTRAVENOUS ONCE
Refills: 0 | Status: COMPLETED | OUTPATIENT
Start: 2020-08-01 | End: 2020-08-01

## 2020-08-01 RX ORDER — SODIUM CHLORIDE 9 MG/ML
1500 INJECTION INTRAMUSCULAR; INTRAVENOUS; SUBCUTANEOUS ONCE
Refills: 0 | Status: COMPLETED | OUTPATIENT
Start: 2020-08-01 | End: 2020-08-01

## 2020-08-01 RX ORDER — ACETAMINOPHEN 500 MG
650 TABLET ORAL ONCE
Refills: 0 | Status: COMPLETED | OUTPATIENT
Start: 2020-08-01 | End: 2020-08-01

## 2020-08-01 RX ORDER — ACETAMINOPHEN 500 MG
650 TABLET ORAL EVERY 6 HOURS
Refills: 0 | Status: DISCONTINUED | OUTPATIENT
Start: 2020-08-01 | End: 2020-08-02

## 2020-08-01 RX ADMIN — Medication 100 MILLIEQUIVALENT(S): at 16:14

## 2020-08-01 RX ADMIN — Medication 100 MILLIEQUIVALENT(S): at 17:38

## 2020-08-01 RX ADMIN — Medication 650 MILLIGRAM(S): at 16:07

## 2020-08-01 RX ADMIN — PIPERACILLIN AND TAZOBACTAM 200 GRAM(S): 4; .5 INJECTION, POWDER, LYOPHILIZED, FOR SOLUTION INTRAVENOUS at 13:37

## 2020-08-01 RX ADMIN — SODIUM CHLORIDE 1000 MILLILITER(S): 9 INJECTION INTRAMUSCULAR; INTRAVENOUS; SUBCUTANEOUS at 15:49

## 2020-08-01 RX ADMIN — SODIUM CHLORIDE 1500 MILLILITER(S): 9 INJECTION INTRAMUSCULAR; INTRAVENOUS; SUBCUTANEOUS at 13:36

## 2020-08-01 RX ADMIN — Medication 650 MILLIGRAM(S): at 23:27

## 2020-08-01 RX ADMIN — Medication 650 MILLIGRAM(S): at 23:24

## 2020-08-01 RX ADMIN — SODIUM CHLORIDE 1500 MILLILITER(S): 9 INJECTION INTRAMUSCULAR; INTRAVENOUS; SUBCUTANEOUS at 14:40

## 2020-08-01 RX ADMIN — Medication 20 MILLIEQUIVALENT(S): at 16:06

## 2020-08-01 RX ADMIN — ONDANSETRON 4 MILLIGRAM(S): 8 TABLET, FILM COATED ORAL at 16:06

## 2020-08-01 RX ADMIN — ENOXAPARIN SODIUM 40 MILLIGRAM(S): 100 INJECTION SUBCUTANEOUS at 23:25

## 2020-08-01 NOTE — CONSULT NOTE ADULT - ASSESSMENT
24F with epigastric/RUQ abd pain x6d  unlikely acute cholecystitis given no leukocytosis, LFTs wnl, no stones in the gallbladder on ultrasound  +UA with nitrites, leuk est and WBC >50, febrile to 103.2, code sepsis in ED  high suspicion for COVID given presenting symptoms    - recommend admission to medicine  - recommend repeat COVID swab and test for antibodies  - recommend hydration with IVF  - IV abx  - recommend urology consult for renal infarcts vs. pyelonephritis  - no surgical intervention warranted at this time  - case and plan discussed with Dr. Carter, agrees  - reconsult surgery as needed

## 2020-08-01 NOTE — H&P ADULT - PROBLEM SELECTOR PLAN 5
IMPROVE VTE score: 0  Will manage with: Lovenox S/C    [ ] Previous VTE                                    3  [ ] Thrombophilia                                  2  [ ] Lower limb paralysis                        2  (unable to hold up >15 seconds)    [ ] Current Cancer (within 6 months)        2   [] Immobilization > 24 hrs                    1  [ ] ICU/CCU stay > 24 hrs                      1  [] Age > 60                                         1 IMPROVE VTE score: 0  Will hold as patient is ambulatory 23yo with worsening anemia. Consider starting if becomes acutely ill    [ ] Previous VTE                                    3  [ ] Thrombophilia                                  2  [ ] Lower limb paralysis                        2  (unable to hold up >15 seconds)    [ ] Current Cancer (within 6 months)        2   [] Immobilization > 24 hrs                    1  [ ] ICU/CCU stay > 24 hrs                      1  [] Age > 60                                         1

## 2020-08-01 NOTE — H&P ADULT - PROBLEM SELECTOR PLAN 3
Noted with anemia with hgb of 9.3. Patient did not endorse  menorrhagia  - Follow up iron studies, ferritin, iron, TIBC Noted with anemia with hgb of 9.3. Downtrending after fluids  - Follow up iron studies, ferritin, iron, TIBC  - Consider Gyn consult if menorrhagia, although patient denied excessive blood loss

## 2020-08-01 NOTE — H&P ADULT - HISTORY OF PRESENT ILLNESS
Patient is a 24 year old Uzebek speaking female with PMHx of UTI, presenting with chief complain of fever. Patient started developing fevers, cough, SOB 6 days ago. Was taking acetaminophen ATC then developed RUQ abdominal pain 4 days ago. Also with complains of "kidney pain", dizziness on exertion. Endorsed dysuria and strong urine odor. Also with nausea/vomiting, at its worst, multiple times a day, every 10 minutes. No chest pain, no recent travel, no blurry vision, constipation, diarrhea.    In the ED, was septic with hypotension, tachycardia, Tmax of 103. Received Zosyn and multiple IVF bolus. UA positive, also with Jones;s sign. CT abd with gallbladder wall thickening, pericholecystic fluid. Kidney with wedge shaped hypodensity.

## 2020-08-01 NOTE — H&P ADULT - ATTENDING COMMENTS
Vital Signs Last 24 Hrs  T(C): 39.4 (01 Aug 2020 23:50), Max: 39.6 (01 Aug 2020 12:33)  T(F): 103 (01 Aug 2020 23:50), Max: 103.2 (01 Aug 2020 12:33)  HR: 89 (01 Aug 2020 22:39) (89 - 101)  BP: 114/68 (01 Aug 2020 22:39) (95/60 - 114/68)  BP(mean): --  RR: 16 (01 Aug 2020 22:39) (16 - 18)  SpO2: 97% (01 Aug 2020 22:39) (97% - 100%) Pt seen at bedside  Chart reviewed  Discussed with MAR    24 year old woman with 1 week of fever and right sided abdominal pain. No prior episodes.    Vital Signs Last 24 Hrs  T(C): 39.4 (01 Aug 2020 23:50), Max: 39.6 (01 Aug 2020 12:33)  T(F): 103 (01 Aug 2020 23:50), Max: 103.2 (01 Aug 2020 12:33)  HR: 89 (01 Aug 2020 22:39) (89 - 101)  BP: 114/68 (01 Aug 2020 22:39) (95/60 - 114/68)  RR: 16 (01 Aug 2020 22:39) (16 - 18)  SpO2: 97% (01 Aug 2020 22:39) (97% - 100%)    Labs                        9.3    5.05  )-----------( 296      ( 01 Aug 2020 13:44 )             30.6     PT/INR - ( 01 Aug 2020 13:44 )   PT: 14.1 sec;   INR: 1.22 ratio    PTT - ( 01 Aug 2020 13:44 )  PTT:35.6 sec        137  |  103  |  5<L>  ----------------------------<  104<H>  3.0<L>   |  26  |  0.55    Ca    8.2<L>      01 Aug 2020 13:44    TPro  7.4  /  Alb  2.9<L>  /  TBili  0.5  /  DBili  x   /  AST  37  /  ALT  56  /  AlkPhos  161<H>      Urinalysis Basic - ( 01 Aug 2020 14:32 )  Color: Yellow / Appearance: Clear / S.005 / pH: x  Gluc: x / Ketone: Negative  / Bili: Negative / Urobili: 1   Blood: x / Protein: 30 mg/dL / Nitrite: Positive   Leuk Esterase: Small / RBC: 2-5 /HPF / WBC >50 /HPF   Sq Epi: x / Non Sq Epi: Moderate /HPF / Bacteria: Many /HPF    COVID-19 PCR: NotDetec (01 Aug 2020 14:20)    Abd CT  LIVER: Diffuse periportal edema.  BILE DUCTS: Normal caliber.  GALLBLADDER: Bladder wall thickening and or pericholecystic fluid.  KIDNEYS/URETERS: Right kidney demonstrates numerous wedge-shaped areas of hypoenhancement. This could represent infection versus infarction.     Impression  Young woman with 1 week of right sided abdominal pain and fever with imaging study concerning for acute cholecystitis and suspicion for right kidney multiple wedge shaped infarction.    A/P  - Acute cholecystitis  - UTI  - Right kidney wedge shaped infarction  - Hypokalemia  -Hepatomegaly     Plan  - Admit to medicine  - Pain control  - IVF hydration  -  Empiric IV antibiotics; zosyn 3.375g q 8 hourly  - F/up sepsis work up  - Correct electrolyte deficits; check Mg, PO4  - General and urologic surgery consultations requested Pt seen at bedside  Chart reviewed  Discussed with MAR    24 year old woman with 1 week of fever and right sided abdominal pain. No prior episodes.    Vital Signs Last 24 Hrs  T(C): 39.4 (01 Aug 2020 23:50), Max: 39.6 (01 Aug 2020 12:33)  T(F): 103 (01 Aug 2020 23:50), Max: 103.2 (01 Aug 2020 12:33)  HR: 89 (01 Aug 2020 22:39) (89 - 101)  BP: 114/68 (01 Aug 2020 22:39) (95/60 - 114/68)  RR: 16 (01 Aug 2020 22:39) (16 - 18)  SpO2: 97% (01 Aug 2020 22:39) (97% - 100%)    Labs                        9.3    5.05  )-----------( 296      ( 01 Aug 2020 13:44 )             30.6     PT/INR - ( 01 Aug 2020 13:44 )   PT: 14.1 sec;   INR: 1.22 ratio    PTT - ( 01 Aug 2020 13:44 )  PTT:35.6 sec        137  |  103  |  5<L>  ----------------------------<  104<H>  3.0<L>   |  26  |  0.55    Ca    8.2<L>      01 Aug 2020 13:44    TPro  7.4  /  Alb  2.9<L>  /  TBili  0.5  /  DBili  x   /  AST  37  /  ALT  56  /  AlkPhos  161<H>      Urinalysis Basic - ( 01 Aug 2020 14:32 )  Color: Yellow / Appearance: Clear / S.005 / pH: x  Gluc: x / Ketone: Negative  / Bili: Negative / Urobili: 1   Blood: x / Protein: 30 mg/dL / Nitrite: Positive   Leuk Esterase: Small / RBC: 2-5 /HPF / WBC >50 /HPF   Sq Epi: x / Non Sq Epi: Moderate /HPF / Bacteria: Many /HPF    COVID-19 PCR: NotDetec (01 Aug 2020 14:20)    Abd CT  LIVER: Diffuse periportal edema.  BILE DUCTS: Normal caliber.  GALLBLADDER: Bladder wall thickening and or pericholecystic fluid.  KIDNEYS/URETERS: Right kidney demonstrates numerous wedge-shaped areas of hypoenhancement. This could represent infection versus infarction.     Impression  Young woman with 1 week of right sided abdominal pain and fever with imaging study concerning for acute cholecystitis and suspicion for right kidney multiple wedge shaped infarction and UTI. She also has anemia which appears to be chronic iron deficiency induced. Hx of menorrhagia.    A/P  - Acute cholecystitis  - UTI  - Right kidney wedge shaped infarction  - Hypokalemia  -Hepatomegaly   - Suspected Iron deficiency anemia    Plan  - Admit to medicine  - Pain control  - IVF hydration  -  Empiric IV antibiotics; zosyn 3.375g q 8 hourly  - F/up sepsis work up  - Correct electrolyte deficits; check Mg, PO4  - General and urologic surgery consultations requested  - check iron panel; FOBT, Pelvic ultrasound with GYN consultation  - Unsure if hepatomegaly is acute in the setting of periportal inflammation; Will consider post management imaging to re-evaluate size; Pt seen at bedside  Chart reviewed  Discussed with MAR      24 year old woman seen on behalf of Dr Lipscomb with 1 week of fever and right sided abdominal pain.   No prior episodes.    Vital Signs Last 24 Hrs  T(C): 39.4 (01 Aug 2020 23:50), Max: 39.6 (01 Aug 2020 12:33)  T(F): 103 (01 Aug 2020 23:50), Max: 103.2 (01 Aug 2020 12:33)  HR: 89 (01 Aug 2020 22:39) (89 - 101)  BP: 114/68 (01 Aug 2020 22:39) (95/60 - 114/68)  RR: 16 (01 Aug 2020 22:39) (16 - 18)  SpO2: 97% (01 Aug 2020 22:39) (97% - 100%)    Young woman, NAD presently, sleeping in bed  CTA B/l RRR S1S2  Soft TTP in both RUQ , epigastric and RLQ, no rebound  No pedal edema  No focal deficits      Labs                        9.3    5.05  )-----------( 296      ( 01 Aug 2020 13:44 )             30.6     PT/INR - ( 01 Aug 2020 13:44 )   PT: 14.1 sec;   INR: 1.22 ratio    PTT - ( 01 Aug 2020 13:44 )  PTT:35.6 sec        137  |  103  |  5<L>  ----------------------------<  104<H>  3.0<L>   |  26  |  0.55    Ca    8.2<L>      01 Aug 2020 13:44    TPro  7.4  /  Alb  2.9<L>  /  TBili  0.5  /  DBili  x   /  AST  37  /  ALT  56  /  AlkPhos  161<H>      Urinalysis Basic - ( 01 Aug 2020 14:32 )  Color: Yellow / Appearance: Clear / S.005 / pH: x  Gluc: x / Ketone: Negative  / Bili: Negative / Urobili: 1   Blood: x / Protein: 30 mg/dL / Nitrite: Positive   Leuk Esterase: Small / RBC: 2-5 /HPF / WBC >50 /HPF   Sq Epi: x / Non Sq Epi: Moderate /HPF / Bacteria: Many /HPF    COVID-19 PCR: NotDetec (01 Aug 2020 14:20)    Abd CT  LIVER: Diffuse periportal edema.  BILE DUCTS: Normal caliber.  GALLBLADDER: Bladder wall thickening and or pericholecystic fluid.  KIDNEYS/URETERS: Right kidney demonstrates numerous wedge-shaped areas of hypoenhancement. This could represent infection versus infarction.     Impression  Young woman with 1 week of right sided abdominal pain and fever with imaging study concerning for acute cholecystitis and suspicion for right kidney multiple wedge shaped infarction and UTI. She also has anemia which appears to be chronic iron deficiency induced. Hx of menorrhagia.    A/P  - Acute cholecystitis  - UTI +/- right sided pyelonephritis  - Right kidney wedge shaped area of hypoenhancement concerning for infarction  - Hypokalemia  -Hepatomegaly   - Suspected Iron deficiency anemia    Plan  - Admit to medicine  - Pain control  - IVF hydration  -  Empiric IV antibiotics; zosyn 3.375g q 8 hourly  - F/up sepsis work up  - Concern for infarction are low in this 24 year old woman with no predisposing risk factors; will suggest repeat imaging   - Correct electrolyte deficits; check Mg, PO4  - General and urologic surgery consultations requested  - check iron panel; FOBT, Pelvic ultrasound with GYN consultation  - Unsure if hepatomegaly is acute in the setting of periportal inflammation; Will consider post management imaging to re-evaluate size;

## 2020-08-01 NOTE — ED CLERICAL - CLERICAL COMMENTS
assigned to [id number] @2659pm assigned to 256S @7361ms pt. needs to be re-tested for covid, going back to the ED assigned to 383t @261

## 2020-08-01 NOTE — H&P ADULT - ASSESSMENT
24 year old female with no PMHx presented with sepsis likely due to pyelonephritis, also with gall bladder findings. To be admitted for further treatment of pyelonephritis.

## 2020-08-01 NOTE — H&P ADULT - NSHPPHYSICALEXAM_GEN_ALL_CORE
General - Thin, no acute distress  Eyes - PERRLA, EOM intact  Cardiovascular - +s1/s2 regular, no murmurs  Lungs - Clear to ascultation, no use of accessory muscles, no crackles or wheezes.  Skin - No rashes, skin warm and dry, no erythematous areas  Abdomen - + whatley's sign, soft. No CVA tenderness elicited  Extremities - No edema, cyanosis or clubbing  Musculoskeletal - 5/5 strength, normal range of motion, no swollen or erythematous  joints.  Neurological – Alert and oriented x 3, CN 2-12 grossly intact.

## 2020-08-01 NOTE — ED PROVIDER NOTE - CLINICAL SUMMARY MEDICAL DECISION MAKING FREE TEXT BOX
23 y/o woman, no PMH, c/o 6 days of fever, cough, occasional shortness of breath, right sided abdominal pain.   No vomiting/diarrhea/CP/dysuria/hematuria.  No h/o prior surgery--Code sepsis, labs, EKG, CXR, UA, CT A/P, cultures, ABx, reassess.

## 2020-08-01 NOTE — ED PROVIDER NOTE - PROGRESS NOTE DETAILS
Due to CT and US findings with GB wall thickening, +Jones's sign, periportal edema, consulted surgery, discussed with house surgeon Dr. Abreu for evaluation.  However WBC and lactate WNL.  UA with moderate epithelial cells but +Nitrite and LE--covered for UTI with Zosyn already. Dr. Abreu evaluated Pt.  He discussed with general surgery attending and urology attending Dr. Rich.  Surgery does not advise any recommendations from their standpoint.  Dr. Rich advised IV ABx for treatment of pyelonephritis and admit to medicine.  Pt's PMD is Dr. Anson Robb, who admits to Dr. Dupree.  Dr. Dupree informed but asked to admit to Dr. Saab, who was paged.

## 2020-08-01 NOTE — H&P ADULT - PROBLEM SELECTOR PLAN 2
With whatley's sign, imaging findings of gallbladder wall thickening, pericholecystic fluid  - Started on antibiotcs, Zosyn which will cover pyelonephritis/intraabd infection  - Likely will need cholecystectomy, however as per surgery, not warranted at this time.  - Follow up surgery regarding acute cholecystitis With whatley's sign, imaging findings of gallbladder wall thickening, pericholecystic fluid  - Started on antibiotics, Zosyn which will cover pyelonephritis/intraabd infection  - Likely will need cholecystectomy, however as per surgery, does not believe to be cholecystitis as no stones, no WBC. However does have transaminitis.  - Will obtain HIDA scan to r/o cholecystitis.  - Follow up surgery regarding acute cholecystitis

## 2020-08-01 NOTE — ED PROVIDER NOTE - OBJECTIVE STATEMENT
23 y/o woman, no PMH, c/o 6 days of fever, cough, occasional shortness of breath, right sided abdominal pain.   No vomiting/diarrhea/CP/dysuria/hematuria.  No h/o prior surgery.  Does not suspect pregnancy. 25 y/o woman, no PMH, c/o 6 days of fever, cough, occasional shortness of breath, right sided abdominal pain.   No vomiting/diarrhea/CP/dysuria/hematuria.  No h/o prior surgery.  Does not suspect pregnancy.  [place] , Lani, #[id number].

## 2020-08-02 DIAGNOSIS — Z29.9 ENCOUNTER FOR PROPHYLACTIC MEASURES, UNSPECIFIED: ICD-10-CM

## 2020-08-02 DIAGNOSIS — N12 TUBULO-INTERSTITIAL NEPHRITIS, NOT SPECIFIED AS ACUTE OR CHRONIC: ICD-10-CM

## 2020-08-02 DIAGNOSIS — D50.9 IRON DEFICIENCY ANEMIA, UNSPECIFIED: ICD-10-CM

## 2020-08-02 DIAGNOSIS — K81.0 ACUTE CHOLECYSTITIS: ICD-10-CM

## 2020-08-02 DIAGNOSIS — E87.6 HYPOKALEMIA: ICD-10-CM

## 2020-08-02 LAB
24R-OH-CALCIDIOL SERPL-MCNC: 14.3 NG/ML — LOW (ref 30–80)
A1C WITH ESTIMATED AVERAGE GLUCOSE RESULT: 5.5 % — SIGNIFICANT CHANGE UP (ref 4–5.6)
ALBUMIN SERPL ELPH-MCNC: 2.3 G/DL — LOW (ref 3.5–5)
ALP SERPL-CCNC: 160 U/L — HIGH (ref 40–120)
ALT FLD-CCNC: 53 U/L DA — SIGNIFICANT CHANGE UP (ref 10–60)
ANION GAP SERPL CALC-SCNC: 9 MMOL/L — SIGNIFICANT CHANGE UP (ref 5–17)
APAP SERPL-MCNC: 6 UG/ML — LOW (ref 10–30)
AST SERPL-CCNC: 43 U/L — HIGH (ref 10–40)
BASOPHILS # BLD AUTO: 0.02 K/UL — SIGNIFICANT CHANGE UP (ref 0–0.2)
BASOPHILS NFR BLD AUTO: 0.3 % — SIGNIFICANT CHANGE UP (ref 0–2)
BILIRUB SERPL-MCNC: 0.5 MG/DL — SIGNIFICANT CHANGE UP (ref 0.2–1.2)
BUN SERPL-MCNC: 4 MG/DL — LOW (ref 7–18)
CALCIUM SERPL-MCNC: 8.1 MG/DL — LOW (ref 8.4–10.5)
CHLORIDE SERPL-SCNC: 105 MMOL/L — SIGNIFICANT CHANGE UP (ref 96–108)
CHOLEST SERPL-MCNC: 85 MG/DL — SIGNIFICANT CHANGE UP (ref 10–199)
CO2 SERPL-SCNC: 25 MMOL/L — SIGNIFICANT CHANGE UP (ref 22–31)
CREAT SERPL-MCNC: 0.52 MG/DL — SIGNIFICANT CHANGE UP (ref 0.5–1.3)
EOSINOPHIL # BLD AUTO: 0 K/UL — SIGNIFICANT CHANGE UP (ref 0–0.5)
EOSINOPHIL NFR BLD AUTO: 0 % — SIGNIFICANT CHANGE UP (ref 0–6)
ESTIMATED AVERAGE GLUCOSE: 111 MG/DL — SIGNIFICANT CHANGE UP (ref 68–114)
FERRITIN SERPL-MCNC: 61 NG/ML — SIGNIFICANT CHANGE UP (ref 15–150)
FOLATE SERPL-MCNC: 15.4 NG/ML — SIGNIFICANT CHANGE UP
GGT SERPL-CCNC: 85 U/L — HIGH (ref 8–40)
GLUCOSE SERPL-MCNC: 94 MG/DL — SIGNIFICANT CHANGE UP (ref 70–99)
HAV IGM SER-ACNC: SIGNIFICANT CHANGE UP
HBV CORE IGM SER-ACNC: SIGNIFICANT CHANGE UP
HBV SURFACE AG SER-ACNC: SIGNIFICANT CHANGE UP
HCT VFR BLD CALC: 28.1 % — LOW (ref 34.5–45)
HCV AB S/CO SERPL IA: 0.13 S/CO — SIGNIFICANT CHANGE UP (ref 0–0.99)
HCV AB SERPL-IMP: SIGNIFICANT CHANGE UP
HDLC SERPL-MCNC: 21 MG/DL — LOW
HGB BLD-MCNC: 8.6 G/DL — LOW (ref 11.5–15.5)
IMM GRANULOCYTES NFR BLD AUTO: 0.5 % — SIGNIFICANT CHANGE UP (ref 0–1.5)
IRON SATN MFR SERPL: 10 UG/DL — LOW (ref 40–150)
IRON SATN MFR SERPL: 4 % — LOW (ref 15–50)
LIDOCAIN IGE QN: 51 U/L — LOW (ref 73–393)
LIPID PNL WITH DIRECT LDL SERPL: 37 MG/DL — SIGNIFICANT CHANGE UP
LYMPHOCYTES # BLD AUTO: 1.46 K/UL — SIGNIFICANT CHANGE UP (ref 1–3.3)
LYMPHOCYTES # BLD AUTO: 22.8 % — SIGNIFICANT CHANGE UP (ref 13–44)
MAGNESIUM SERPL-MCNC: 2 MG/DL — SIGNIFICANT CHANGE UP (ref 1.6–2.6)
MCHC RBC-ENTMCNC: 23.4 PG — LOW (ref 27–34)
MCHC RBC-ENTMCNC: 30.6 GM/DL — LOW (ref 32–36)
MCV RBC AUTO: 76.4 FL — LOW (ref 80–100)
MONOCYTES # BLD AUTO: 0.7 K/UL — SIGNIFICANT CHANGE UP (ref 0–0.9)
MONOCYTES NFR BLD AUTO: 11 % — SIGNIFICANT CHANGE UP (ref 2–14)
NEUTROPHILS # BLD AUTO: 4.18 K/UL — SIGNIFICANT CHANGE UP (ref 1.8–7.4)
NEUTROPHILS NFR BLD AUTO: 65.4 % — SIGNIFICANT CHANGE UP (ref 43–77)
NRBC # BLD: 0 /100 WBCS — SIGNIFICANT CHANGE UP (ref 0–0)
PHOSPHATE SERPL-MCNC: 2.8 MG/DL — SIGNIFICANT CHANGE UP (ref 2.5–4.5)
PLATELET # BLD AUTO: 281 K/UL — SIGNIFICANT CHANGE UP (ref 150–400)
POTASSIUM SERPL-MCNC: 3.1 MMOL/L — LOW (ref 3.5–5.3)
POTASSIUM SERPL-SCNC: 3.1 MMOL/L — LOW (ref 3.5–5.3)
PROT SERPL-MCNC: 6.3 G/DL — SIGNIFICANT CHANGE UP (ref 6–8.3)
RBC # BLD: 3.68 M/UL — LOW (ref 3.8–5.2)
RBC # FLD: 15.2 % — HIGH (ref 10.3–14.5)
SARS-COV-2 IGG SERPL QL IA: NEGATIVE — SIGNIFICANT CHANGE UP
SARS-COV-2 IGM SERPL IA-ACNC: 0.08 INDEX — SIGNIFICANT CHANGE UP
SARS-COV-2 RNA SPEC QL NAA+PROBE: SIGNIFICANT CHANGE UP
SODIUM SERPL-SCNC: 139 MMOL/L — SIGNIFICANT CHANGE UP (ref 135–145)
TIBC SERPL-MCNC: 265 UG/DL — SIGNIFICANT CHANGE UP (ref 250–450)
TOTAL CHOLESTEROL/HDL RATIO MEASUREMENT: 4 RATIO — SIGNIFICANT CHANGE UP (ref 3.3–7.1)
TRIGL SERPL-MCNC: 133 MG/DL — SIGNIFICANT CHANGE UP (ref 10–149)
TSH SERPL-MCNC: 1.34 UU/ML — SIGNIFICANT CHANGE UP (ref 0.34–4.82)
UIBC SERPL-MCNC: 255 UG/DL — SIGNIFICANT CHANGE UP (ref 110–370)
VIT B12 SERPL-MCNC: 582 PG/ML — SIGNIFICANT CHANGE UP (ref 232–1245)
WBC # BLD: 6.39 K/UL — SIGNIFICANT CHANGE UP (ref 3.8–10.5)
WBC # FLD AUTO: 6.39 K/UL — SIGNIFICANT CHANGE UP (ref 3.8–10.5)

## 2020-08-02 RX ORDER — SODIUM CHLORIDE 9 MG/ML
1000 INJECTION INTRAMUSCULAR; INTRAVENOUS; SUBCUTANEOUS
Refills: 0 | Status: DISCONTINUED | OUTPATIENT
Start: 2020-08-02 | End: 2020-08-05

## 2020-08-02 RX ORDER — ACETAMINOPHEN 500 MG
650 TABLET ORAL EVERY 6 HOURS
Refills: 0 | Status: DISCONTINUED | OUTPATIENT
Start: 2020-08-02 | End: 2020-08-05

## 2020-08-02 RX ORDER — SODIUM CHLORIDE 9 MG/ML
1000 INJECTION INTRAMUSCULAR; INTRAVENOUS; SUBCUTANEOUS
Refills: 0 | Status: DISCONTINUED | OUTPATIENT
Start: 2020-08-02 | End: 2020-08-02

## 2020-08-02 RX ORDER — PIPERACILLIN AND TAZOBACTAM 4; .5 G/20ML; G/20ML
3.38 INJECTION, POWDER, LYOPHILIZED, FOR SOLUTION INTRAVENOUS EVERY 8 HOURS
Refills: 0 | Status: DISCONTINUED | OUTPATIENT
Start: 2020-08-02 | End: 2020-08-05

## 2020-08-02 RX ORDER — IBUPROFEN 200 MG
400 TABLET ORAL EVERY 8 HOURS
Refills: 0 | Status: DISCONTINUED | OUTPATIENT
Start: 2020-08-02 | End: 2020-08-05

## 2020-08-02 RX ORDER — ONDANSETRON 8 MG/1
4 TABLET, FILM COATED ORAL EVERY 6 HOURS
Refills: 0 | Status: DISCONTINUED | OUTPATIENT
Start: 2020-08-02 | End: 2020-08-05

## 2020-08-02 RX ORDER — POTASSIUM CHLORIDE 20 MEQ
40 PACKET (EA) ORAL ONCE
Refills: 0 | Status: COMPLETED | OUTPATIENT
Start: 2020-08-02 | End: 2020-08-02

## 2020-08-02 RX ORDER — IBUPROFEN 200 MG
400 TABLET ORAL EVERY 8 HOURS
Refills: 0 | Status: DISCONTINUED | OUTPATIENT
Start: 2020-08-02 | End: 2020-08-02

## 2020-08-02 RX ADMIN — Medication 400 MILLIGRAM(S): at 00:12

## 2020-08-02 RX ADMIN — PIPERACILLIN AND TAZOBACTAM 25 GRAM(S): 4; .5 INJECTION, POWDER, LYOPHILIZED, FOR SOLUTION INTRAVENOUS at 22:05

## 2020-08-02 RX ADMIN — Medication 40 MILLIEQUIVALENT(S): at 12:08

## 2020-08-02 RX ADMIN — PIPERACILLIN AND TAZOBACTAM 25 GRAM(S): 4; .5 INJECTION, POWDER, LYOPHILIZED, FOR SOLUTION INTRAVENOUS at 05:49

## 2020-08-02 RX ADMIN — SODIUM CHLORIDE 100 MILLILITER(S): 9 INJECTION INTRAMUSCULAR; INTRAVENOUS; SUBCUTANEOUS at 10:31

## 2020-08-02 RX ADMIN — PIPERACILLIN AND TAZOBACTAM 25 GRAM(S): 4; .5 INJECTION, POWDER, LYOPHILIZED, FOR SOLUTION INTRAVENOUS at 14:04

## 2020-08-02 RX ADMIN — Medication 400 MILLIGRAM(S): at 00:26

## 2020-08-02 RX ADMIN — PIPERACILLIN AND TAZOBACTAM 25 GRAM(S): 4; .5 INJECTION, POWDER, LYOPHILIZED, FOR SOLUTION INTRAVENOUS at 00:14

## 2020-08-02 RX ADMIN — SODIUM CHLORIDE 100 MILLILITER(S): 9 INJECTION INTRAMUSCULAR; INTRAVENOUS; SUBCUTANEOUS at 05:49

## 2020-08-02 NOTE — PROGRESS NOTE ADULT - PROBLEM SELECTOR PLAN 1
UA positive, CT abd with findings of numerous wedge shaped densities indicating infection vs. infarct  -Continue with Zosyn q8hr  - Blood cultures show no growth.   - Urology consult appreciated, recommend no intervention.

## 2020-08-02 NOTE — PROGRESS NOTE ADULT - SUBJECTIVE AND OBJECTIVE BOX
INTERVAL HPI/OVERNIGHT EVENTS:  Pt reports pain has improved since yesterday. Reports nausea without vomiting. Urinating well without complaints.     MEDICATIONS  (STANDING):  piperacillin/tazobactam IVPB.. 3.375 Gram(s) IV Intermittent every 8 hours  sodium chloride 0.9%. 1000 milliLiter(s) (100 mL/Hr) IV Continuous <Continuous>    MEDICATIONS  (PRN):  acetaminophen   Tablet .. 650 milliGRAM(s) Oral every 6 hours PRN Temp greater or equal to 38C (100.4F)  ibuprofen  Tablet. 400 milliGRAM(s) Oral every 8 hours PRN Temp greater or equal to 38.5C (101.3F)  ondansetron Injectable 4 milliGRAM(s) IV Push every 6 hours PRN Nausea and/or Vomiting    Vital Signs Last 24 Hrs  T(C): 36.6 (02 Aug 2020 07:32), Max: 39.4 (01 Aug 2020 22:39)  T(F): 97.9 (02 Aug 2020 07:32), Max: 103 (01 Aug 2020 22:39)  HR: 92 (02 Aug 2020 07:32) (58 - 96)  BP: 98/60 (02 Aug 2020 07:32) (95/60 - 114/68)  RR: 17 (02 Aug 2020 07:32) (16 - 18)  SpO2: 100% (02 Aug 2020 07:32) (97% - 100%)    Physical:  General: Alert and oriented, not in acute distress  Resp: Breathing unlabored  Abdomen: soft, mildly tender to right upper quadrant and right lower quadrant, no rebound, no guarding  Back: No CVAT b/l  : voiding clear urine     LABS:                     8.6    6.39  )-----------( 281      ( 02 Aug 2020 07:55 )             28.1             08-02    139  |  105  |  4<L>  ----------------------------<  94  3.1<L>   |  25  |  0.52    Ca    8.1<L>      02 Aug 2020 07:55  Phos  2.8     08-02  Mg     2.0     08-02    TPro  6.3  /  Alb  2.3<L>  /  TBili  0.5  /  DBili  x   /  AST  43<H>  /  ALT  53  /  AlkPhos  160<H>  08-02

## 2020-08-02 NOTE — PROGRESS NOTE ADULT - PROBLEM SELECTOR PLAN 2
With whatley's sign, imaging findings of gallbladder wall thickening, pericholecystic fluid  - Started on antibiotics, Zosyn which will cover pyelonephritis/intraabd infection  - Likely will need cholecystectomy, however as per surgery, does not believe to be cholecystitis as no stones, no WBC.   - Will obtain HIDA scan to r/o cholecystitis.  - Follow up surgery regarding acute cholecystitis

## 2020-08-02 NOTE — PROGRESS NOTE ADULT - SUBJECTIVE AND OBJECTIVE BOX
Patient is a 24y old  Female who presents with a chief complaint of Fever (02 Aug 2020 13:56)      SUBJECTIVE / OVERNIGHT EVENTS: patient feels ok.     T(C): 37.1 (08-02-20 @ 15:30), Max: 37.1 (08-02-20 @ 15:30)  HR: 109 (08-02-20 @ 15:30) (109 - 109)  BP: 111/64 (08-02-20 @ 15:30) (111/64 - 111/64)  RR: 17 (08-02-20 @ 15:30) (17 - 17)  SpO2: 99% (08-02-20 @ 15:30) (99% - 99%)    MEDICATIONS  (STANDING):  piperacillin/tazobactam IVPB.. 3.375 Gram(s) IV Intermittent every 8 hours  sodium chloride 0.9%. 1000 milliLiter(s) (100 mL/Hr) IV Continuous <Continuous>    MEDICATIONS  (PRN):  acetaminophen   Tablet .. 650 milliGRAM(s) Oral every 6 hours PRN Temp greater or equal to 38C (100.4F)  ibuprofen  Tablet. 400 milliGRAM(s) Oral every 8 hours PRN Temp greater or equal to 38.5C (101.3F)  ondansetron Injectable 4 milliGRAM(s) IV Push every 6 hours PRN Nausea and/or Vomiting    PHYSICAL EXAM:  GENERAL: NAD, well-developed  HEAD:  Atraumatic, Normocephalic  EYES: EOMI, PERRLA, conjunctiva and sclera clear  NECK: Supple, No JVD  CHEST/LUNG: Clear to auscultation bilaterally; No wheeze  HEART: Regular rate and rhythm; No murmurs, rubs, or gallops  ABDOMEN: Soft, RUQ tenderness, Nondistended; Bowel sounds present  EXTREMITIES:  2+ Peripheral Pulses, No clubbing, cyanosis, or edema  PSYCH: AAOx3  NEUROLOGY: non-focal  SKIN: No rashes or lesions                          8.6    6.39  )-----------( 281      ( 02 Aug 2020 07:55 )             28.1           LIVER FUNCTIONS - ( 02 Aug 2020 11:09 )  Alb: x     / Pro: x     / ALK PHOS: x     / ALT: x     / AST: x     / GGT: 85 U/L       PT/INR - ( 01 Aug 2020 13:44 )   PT: 14.1 sec;   INR: 1.22 ratio         PTT - ( 01 Aug 2020 13:44 )  PTT:35.6 sec  139|105|4<94  3.1|25|0.52  8.1,2.0,2.8  08-02 @ 07:55      RADIOLOGY & ADDITIONAL TESTS:    Imaging Personally Reviewed:    Consultant(s) Notes Reviewed:      Care Discussed with Consultants/Other Providers:

## 2020-08-02 NOTE — PROGRESS NOTE ADULT - PROBLEM SELECTOR PLAN 5
IMPROVE VTE score: 0  Will hold as patient is ambulatory 25yo with worsening anemia. Consider starting if becomes acutely ill    [ ] Previous VTE                                    3  [ ] Thrombophilia                                  2  [ ] Lower limb paralysis                        2  (unable to hold up >15 seconds)    [ ] Current Cancer (within 6 months)        2   [] Immobilization > 24 hrs                    1  [ ] ICU/CCU stay > 24 hrs                      1  [] Age > 60                                         1

## 2020-08-03 DIAGNOSIS — R79.89 OTHER SPECIFIED ABNORMAL FINDINGS OF BLOOD CHEMISTRY: ICD-10-CM

## 2020-08-03 DIAGNOSIS — E88.09 OTHER DISORDERS OF PLASMA-PROTEIN METABOLISM, NOT ELSEWHERE CLASSIFIED: ICD-10-CM

## 2020-08-03 DIAGNOSIS — E55.9 VITAMIN D DEFICIENCY, UNSPECIFIED: ICD-10-CM

## 2020-08-03 DIAGNOSIS — Z02.9 ENCOUNTER FOR ADMINISTRATIVE EXAMINATIONS, UNSPECIFIED: ICD-10-CM

## 2020-08-03 LAB
ANION GAP SERPL CALC-SCNC: 12 MMOL/L — SIGNIFICANT CHANGE UP (ref 5–17)
BUN SERPL-MCNC: 5 MG/DL — LOW (ref 7–18)
CALCIUM SERPL-MCNC: 8.3 MG/DL — LOW (ref 8.4–10.5)
CHLORIDE SERPL-SCNC: 106 MMOL/L — SIGNIFICANT CHANGE UP (ref 96–108)
CO2 SERPL-SCNC: 19 MMOL/L — LOW (ref 22–31)
CREAT SERPL-MCNC: 0.45 MG/DL — LOW (ref 0.5–1.3)
GLUCOSE SERPL-MCNC: 71 MG/DL — SIGNIFICANT CHANGE UP (ref 70–99)
HCT VFR BLD CALC: 27.5 % — LOW (ref 34.5–45)
HGB BLD-MCNC: 8.4 G/DL — LOW (ref 11.5–15.5)
MCHC RBC-ENTMCNC: 23.1 PG — LOW (ref 27–34)
MCHC RBC-ENTMCNC: 30.5 GM/DL — LOW (ref 32–36)
MCV RBC AUTO: 75.8 FL — LOW (ref 80–100)
NRBC # BLD: 0 /100 WBCS — SIGNIFICANT CHANGE UP (ref 0–0)
PLATELET # BLD AUTO: 342 K/UL — SIGNIFICANT CHANGE UP (ref 150–400)
POTASSIUM SERPL-MCNC: 3.7 MMOL/L — SIGNIFICANT CHANGE UP (ref 3.5–5.3)
POTASSIUM SERPL-SCNC: 3.7 MMOL/L — SIGNIFICANT CHANGE UP (ref 3.5–5.3)
RBC # BLD: 3.63 M/UL — LOW (ref 3.8–5.2)
RBC # FLD: 15.4 % — HIGH (ref 10.3–14.5)
SODIUM SERPL-SCNC: 137 MMOL/L — SIGNIFICANT CHANGE UP (ref 135–145)
WBC # BLD: 8.32 K/UL — SIGNIFICANT CHANGE UP (ref 3.8–10.5)
WBC # FLD AUTO: 8.32 K/UL — SIGNIFICANT CHANGE UP (ref 3.8–10.5)

## 2020-08-03 PROCEDURE — 78226 HEPATOBILIARY SYSTEM IMAGING: CPT | Mod: 26

## 2020-08-03 RX ORDER — ERGOCALCIFEROL 1.25 MG/1
50000 CAPSULE ORAL
Refills: 0 | Status: DISCONTINUED | OUTPATIENT
Start: 2020-08-03 | End: 2020-08-05

## 2020-08-03 RX ORDER — CHOLECALCIFEROL (VITAMIN D3) 125 MCG
1000 CAPSULE ORAL ONCE
Refills: 0 | Status: COMPLETED | OUTPATIENT
Start: 2020-08-03 | End: 2020-08-03

## 2020-08-03 RX ADMIN — Medication 1000 UNIT(S): at 21:57

## 2020-08-03 RX ADMIN — PIPERACILLIN AND TAZOBACTAM 25 GRAM(S): 4; .5 INJECTION, POWDER, LYOPHILIZED, FOR SOLUTION INTRAVENOUS at 21:57

## 2020-08-03 RX ADMIN — PIPERACILLIN AND TAZOBACTAM 25 GRAM(S): 4; .5 INJECTION, POWDER, LYOPHILIZED, FOR SOLUTION INTRAVENOUS at 06:32

## 2020-08-03 RX ADMIN — PIPERACILLIN AND TAZOBACTAM 25 GRAM(S): 4; .5 INJECTION, POWDER, LYOPHILIZED, FOR SOLUTION INTRAVENOUS at 13:49

## 2020-08-03 NOTE — PROGRESS NOTE ADULT - PROBLEM SELECTOR PLAN 1
UA positive, CT abd with findings of numerous wedge shaped densities indicating infection vs. infarct  - Started on Zosyn q8hr  - Follow up urine and blood cultures.  - Follow up urology regarding wedge shaped densities in right kidney. UA positive  CT abd with findings of numerous wedge shaped densities indicating infection vs. infarct  Started on Zosyn q8hr for now.   Urine Cx shows Culture Results:   50,000 - 99,000 CFU/mL Gram Negative Rods  10,000 - 49,000 CFU/mL Yeast like cells (08.01.20 @ 19:01)  Blood cx NGTD  urology followed for wedge shaped densities in right kidney, no urologic intervention needed this time.   Id dr. Pastor consulted

## 2020-08-03 NOTE — PROGRESS NOTE ADULT - SUBJECTIVE AND OBJECTIVE BOX
INTERVAL HPI/OVERNIGHT EVENTS:    No acute events overnight.   Pt resting comfortably. No acute complaints.   Denies pain, fever or chills  urinating well, denies dysuria or hematuria        MEDICATIONS  (STANDING):  piperacillin/tazobactam IVPB.. 3.375 Gram(s) IV Intermittent every 8 hours  sodium chloride 0.9%. 1000 milliLiter(s) (100 mL/Hr) IV Continuous <Continuous>    MEDICATIONS  (PRN):  acetaminophen   Tablet .. 650 milliGRAM(s) Oral every 6 hours PRN Temp greater or equal to 38C (100.4F)  ibuprofen  Tablet. 400 milliGRAM(s) Oral every 8 hours PRN Temp greater or equal to 38.5C (101.3F)  ondansetron Injectable 4 milliGRAM(s) IV Push every 6 hours PRN Nausea and/or Vomiting      Vital Signs Last 24 Hrs  T(C): 36.7 (03 Aug 2020 07:42), Max: 37.1 (02 Aug 2020 15:30)  T(F): 98 (03 Aug 2020 07:42), Max: 98.7 (02 Aug 2020 15:30)  HR: 89 (03 Aug 2020 07:42) (89 - 109)  BP: 92/49 (03 Aug 2020 07:42) (92/49 - 111/64)  BP(mean): --  RR: 17 (03 Aug 2020 07:42) (16 - 17)  SpO2: 100% (03 Aug 2020 07:42) (99% - 100%)      PHYSICAL EXAM  General: Alert and oriented, not in acute distress  Resp: Breathing unlabored  Abdomen: Soft, nondistended, nontender  : No motley catheter, no dysuria or hematuria        I&O's Detail    02 Aug 2020 07:01  -  03 Aug 2020 07:00  --------------------------------------------------------  IN:    sodium chloride 0.9%.: 500 mL  Total IN: 500 mL    OUT:  Total OUT: 0 mL    Total NET: 500 mL          LABS:                        8.4    8.32  )-----------( 342      ( 03 Aug 2020 06:54 )             27.5             08-03    137  |  106  |  5<L>  ----------------------------<  71  3.7   |  19<L>  |  0.45<L>    Ca    8.3<L>      03 Aug 2020 06:54  Phos  2.8     08-02  Mg     2.0     08-02    TPro  6.3  /  Alb  2.3<L>  /  TBili  0.5  /  DBili  x   /  AST  43<H>  /  ALT  53  /  AlkPhos  160<H>  08-02

## 2020-08-03 NOTE — DISCHARGE NOTE PROVIDER - CARE PROVIDER_API CALL
MERCEDES RICHARDSON  [id number]  [address] [facility] [place] [address] [place] [id number]  Phone: [phone]  Fax: [phone]  Follow Up Time: 1 week

## 2020-08-03 NOTE — PROGRESS NOTE ADULT - PROBLEM SELECTOR PLAN 5
DVT ppx- no chemical ppx. pt ambulatory. started Vt D supplement  f/u with PMD for repeat blood test in 8 weeks.

## 2020-08-03 NOTE — PROGRESS NOTE ADULT - SUBJECTIVE AND OBJECTIVE BOX
NP Note discussed with  Primary Attending    Patient is a 24y old  Female who presents with a chief complaint of Fever (03 Aug 2020 08:20)      INTERVAL HPI/OVERNIGHT EVENTS: no new complaints    MEDICATIONS  (STANDING):  piperacillin/tazobactam IVPB.. 3.375 Gram(s) IV Intermittent every 8 hours  sodium chloride 0.9%. 1000 milliLiter(s) (100 mL/Hr) IV Continuous <Continuous>    MEDICATIONS  (PRN):  acetaminophen   Tablet .. 650 milliGRAM(s) Oral every 6 hours PRN Temp greater or equal to 38C (100.4F)  ibuprofen  Tablet. 400 milliGRAM(s) Oral every 8 hours PRN Temp greater or equal to 38.5C (101.3F)  ondansetron Injectable 4 milliGRAM(s) IV Push every 6 hours PRN Nausea and/or Vomiting      __________________________________________________  REVIEW OF SYSTEMS:    CONSTITUTIONAL: No fever,   EYES: no acute visual disturbances  NECK: No pain or stiffness  RESPIRATORY: No cough; No shortness of breath  CARDIOVASCULAR: No chest pain, no palpitations  GASTROINTESTINAL: No pain. No nausea or vomiting; No diarrhea   NEUROLOGICAL: No headache or numbness, no tremors  MUSCULOSKELETAL: No joint pain, no muscle pain  GENITOURINARY: no dysuria, no frequency, no hesitancy  PSYCHIATRY: no depression , no anxiety  ALL OTHER  ROS negative        Vital Signs Last 24 Hrs  T(C): 36.7 (03 Aug 2020 13:22), Max: 37.1 (02 Aug 2020 15:30)  T(F): 98 (03 Aug 2020 13:22), Max: 98.7 (02 Aug 2020 15:30)  HR: 89 (03 Aug 2020 13:22) (89 - 109)  BP: 96/63 (03 Aug 2020 13:22) (92/49 - 111/64)  BP(mean): --  RR: 17 (03 Aug 2020 13:22) (16 - 17)  SpO2: 100% (03 Aug 2020 13:22) (99% - 100%)    ________________________________________________  PHYSICAL EXAM:  GENERAL: NAD  HEENT: Normocephalic;  conjunctivae and sclerae clear; moist mucous membranes;   NECK : supple  CHEST/LUNG: Clear to auscultation bilaterally with good air entry   HEART: S1 S2  regular; no murmurs, gallops or rubs  ABDOMEN: Soft, Nontender, Nondistended; Bowel sounds present  EXTREMITIES: no cyanosis; no edema; no calf tenderness  SKIN: warm and dry; no rash  NERVOUS SYSTEM:  Awake and alert; Oriented  to place, person and time ; no new deficits    _________________________________________________  LABS:                        8.4    8.32  )-----------( 342      ( 03 Aug 2020 06:54 )             27.5     08-03    137  |  106  |  5<L>  ----------------------------<  71  3.7   |  19<L>  |  0.45<L>    Ca    8.3<L>      03 Aug 2020 06:54  Phos  2.8     08-02  Mg     2.0     08-02    TPro  6.3  /  Alb  2.3<L>  /  TBili  0.5  /  DBili  x   /  AST  43<H>  /  ALT  53  /  AlkPhos  160<H>  08-02        CAPILLARY BLOOD GLUCOSE            RADIOLOGY & ADDITIONAL TESTS:    Imaging  Reviewed:  YES    Consultant(s) Notes Reviewed:   YES      Plan of care was discussed with patient and /or primary care giver; all questions and concerns were addressed NP Note discussed with  Primary Attending    Patient is a 24y old  Female who presents with a chief complaint of Fever (03 Aug 2020 08:20)    HPI- Patient is a 24 year old Uzebek speaking female with PMHx of UTI, presenting with chief complain of fever. Patient started developing fevers, cough, SOB 6 days ago. Was taking acetaminophen ATC then developed RUQ abdominal pain 4 days ago. Also with complains of "kidney pain", dizziness on exertion. Endorsed dysuria and strong urine odor. Also with nausea/vomiting, at its worst, multiple times a day, every 10 minutes. No chest pain, no recent travel, no blurry vision, constipation, diarrhea.    In the ED, was septic with hypotension, tachycardia, Tmax of 103. Received Zosyn and multiple IVF bolus. UA positive, also with Jones;s sign. CT abd with gallbladder wall thickening, pericholecystic fluid. Kidney with wedge shaped hypodensity.       INTERVAL HPI/OVERNIGHT EVENTS: no new complaints    MEDICATIONS  (STANDING):  piperacillin/tazobactam IVPB.. 3.375 Gram(s) IV Intermittent every 8 hours  sodium chloride 0.9%. 1000 milliLiter(s) (100 mL/Hr) IV Continuous <Continuous>    MEDICATIONS  (PRN):  acetaminophen   Tablet .. 650 milliGRAM(s) Oral every 6 hours PRN Temp greater or equal to 38C (100.4F)  ibuprofen  Tablet. 400 milliGRAM(s) Oral every 8 hours PRN Temp greater or equal to 38.5C (101.3F)  ondansetron Injectable 4 milliGRAM(s) IV Push every 6 hours PRN Nausea and/or Vomiting      __________________________________________________  REVIEW OF SYSTEMS:    CONSTITUTIONAL: No fever,   EYES: no acute visual disturbances  NECK: No pain or stiffness  RESPIRATORY: No cough; No shortness of breath  CARDIOVASCULAR: No chest pain, no palpitations  GASTROINTESTINAL: No pain. No nausea or vomiting; No diarrhea   NEUROLOGICAL: No headache or numbness, no tremors  MUSCULOSKELETAL: No joint pain, no muscle pain  GENITOURINARY: no dysuria, no frequency, no hesitancy  PSYCHIATRY: no depression , no anxiety  ALL OTHER  ROS negative        Vital Signs Last 24 Hrs  T(C): 36.7 (03 Aug 2020 13:22), Max: 37.1 (02 Aug 2020 15:30)  T(F): 98 (03 Aug 2020 13:22), Max: 98.7 (02 Aug 2020 15:30)  HR: 89 (03 Aug 2020 13:22) (89 - 109)  BP: 96/63 (03 Aug 2020 13:22) (92/49 - 111/64)  BP(mean): --  RR: 17 (03 Aug 2020 13:22) (16 - 17)  SpO2: 100% (03 Aug 2020 13:22) (99% - 100%)    ________________________________________________  PHYSICAL EXAM:  GENERAL: NAD  HEENT: Normocephalic;  conjunctivae and sclerae clear; moist mucous membranes;   NECK : supple  CHEST/LUNG: Clear to auscultation bilaterally with good air entry   HEART: S1 S2  regular; no murmurs, gallops or rubs  ABDOMEN: Soft, Nontender, Nondistended; Bowel sounds present  EXTREMITIES: no cyanosis; no edema; no calf tenderness  SKIN: warm and dry; no rash  NERVOUS SYSTEM:  Awake and alert; Oriented  to place, person and time ; no new deficits    _________________________________________________  LABS:                        8.4    8.32  )-----------( 342      ( 03 Aug 2020 06:54 )             27.5     08-03    137  |  106  |  5<L>  ----------------------------<  71  3.7   |  19<L>  |  0.45<L>    Ca    8.3<L>      03 Aug 2020 06:54  Phos  2.8     08-02  Mg     2.0     08-02    TPro  6.3  /  Alb  2.3<L>  /  TBili  0.5  /  DBili  x   /  AST  43<H>  /  ALT  53  /  AlkPhos  160<H>  08-02        CAPILLARY BLOOD GLUCOSE            RADIOLOGY & ADDITIONAL TESTS:    Imaging  Reviewed:  YES    Consultant(s) Notes Reviewed:   YES      Plan of care was discussed with patient and /or primary care giver; all questions and concerns were addressed NP Note discussed with  Primary Attending    Patient is a 24y old  Female who presents with a chief complaint of Fever (03 Aug 2020 08:20)    HPI- Patient is a 24 year old Uzebek speaking female with PMHx of UTI, presenting with chief complain of fever. Patient started developing fevers, cough, SOB 6 days ago. Was taking acetaminophen ATC then developed RUQ abdominal pain 4 days ago. Also with complains of "kidney pain", dizziness on exertion. Endorsed dysuria and strong urine odor. Also with nausea/vomiting, at its worst, multiple times a day, every 10 minutes. No chest pain, no recent travel, no blurry vision, constipation, diarrhea.    In the ED, was septic with hypotension, tachycardia, Tmax of 103. Received Zosyn and multiple IVF bolus. UA positive, also with Jones;s sign. CT abd with gallbladder wall thickening, pericholecystic fluid. Kidney with wedge shaped hypodensity.   Followed by urology and    INTERVAL HPI/OVERNIGHT EVENTS: no new complaints    MEDICATIONS  (STANDING):  piperacillin/tazobactam IVPB.. 3.375 Gram(s) IV Intermittent every 8 hours  sodium chloride 0.9%. 1000 milliLiter(s) (100 mL/Hr) IV Continuous <Continuous>    MEDICATIONS  (PRN):  acetaminophen   Tablet .. 650 milliGRAM(s) Oral every 6 hours PRN Temp greater or equal to 38C (100.4F)  ibuprofen  Tablet. 400 milliGRAM(s) Oral every 8 hours PRN Temp greater or equal to 38.5C (101.3F)  ondansetron Injectable 4 milliGRAM(s) IV Push every 6 hours PRN Nausea and/or Vomiting      __________________________________________________  REVIEW OF SYSTEMS:    CONSTITUTIONAL: No fever,   EYES: no acute visual disturbances  NECK: No pain or stiffness  RESPIRATORY: No cough; No shortness of breath  CARDIOVASCULAR: No chest pain, no palpitations  GASTROINTESTINAL: No pain. No nausea or vomiting; No diarrhea   NEUROLOGICAL: No headache or numbness, no tremors  MUSCULOSKELETAL: No joint pain, no muscle pain  GENITOURINARY: no dysuria, no frequency, no hesitancy  PSYCHIATRY: no depression , no anxiety  ALL OTHER  ROS negative        Vital Signs Last 24 Hrs  T(C): 36.7 (03 Aug 2020 13:22), Max: 37.1 (02 Aug 2020 15:30)  T(F): 98 (03 Aug 2020 13:22), Max: 98.7 (02 Aug 2020 15:30)  HR: 89 (03 Aug 2020 13:22) (89 - 109)  BP: 96/63 (03 Aug 2020 13:22) (92/49 - 111/64)  BP(mean): --  RR: 17 (03 Aug 2020 13:22) (16 - 17)  SpO2: 100% (03 Aug 2020 13:22) (99% - 100%)    ________________________________________________  PHYSICAL EXAM:  GENERAL: NAD  HEENT: Normocephalic;  conjunctivae and sclerae clear; moist mucous membranes;   NECK : supple  CHEST/LUNG: Clear to auscultation bilaterally with good air entry   HEART: S1 S2  regular; no murmurs, gallops or rubs  ABDOMEN: Soft, Nontender, Nondistended; Bowel sounds present  EXTREMITIES: no cyanosis; no edema; no calf tenderness  SKIN: warm and dry; no rash  NERVOUS SYSTEM:  Awake and alert; Oriented  to place, person and time ; no new deficits    _________________________________________________  LABS:                        8.4    8.32  )-----------( 342      ( 03 Aug 2020 06:54 )             27.5     08-03    137  |  106  |  5<L>  ----------------------------<  71  3.7   |  19<L>  |  0.45<L>    Ca    8.3<L>      03 Aug 2020 06:54  Phos  2.8     08-02  Mg     2.0     08-02    TPro  6.3  /  Alb  2.3<L>  /  TBili  0.5  /  DBili  x   /  AST  43<H>  /  ALT  53  /  AlkPhos  160<H>  08-02        CAPILLARY BLOOD GLUCOSE            RADIOLOGY & ADDITIONAL TESTS:    Imaging  Reviewed:  YES    Consultant(s) Notes Reviewed:   YES      Plan of care was discussed with patient and /or primary care giver; all questions and concerns were addressed NP Note discussed with  Primary Attending    Patient is a 24y old  Female who presents with a chief complaint of Fever (03 Aug 2020 08:20)    HPI- Patient is a 24 year old Uzebek speaking female with PMHx of UTI, presenting with chief complain of fever. Patient started developing fevers, cough, SOB 6 days ago. Was taking acetaminophen ATC then developed RUQ abdominal pain 4 days ago. Also with complains of "kidney pain", dizziness on exertion. Endorsed dysuria and strong urine odor. Also with nausea/vomiting, at its worst, multiple times a day, every 10 minutes. No chest pain, no recent travel, no blurry vision, constipation, diarrhea.    In the ED, was septic with hypotension, tachycardia, Tmax of 103. Received Zosyn and multiple IVF bolus. UA positive, also with Jones;s sign. CT abd with gallbladder wall thickening, pericholecystic fluid. Kidney with wedge shaped hypodensity. Pt is admitted for pyelonephritis, also concern for acute cholecystitis.   Followed by urology and surgery. s/p HIDA, neg for acute norah. Ucx shows GNR. 50,000 - 99,000 CFU/mL   ID consulted.     INTERVAL HPI/OVERNIGHT EVENTS: Seen at bedside. pt reports her pain improves.     MEDICATIONS  (STANDING):  piperacillin/tazobactam IVPB.. 3.375 Gram(s) IV Intermittent every 8 hours  sodium chloride 0.9%. 1000 milliLiter(s) (100 mL/Hr) IV Continuous <Continuous>    MEDICATIONS  (PRN):  acetaminophen   Tablet .. 650 milliGRAM(s) Oral every 6 hours PRN Temp greater or equal to 38C (100.4F)  ibuprofen  Tablet. 400 milliGRAM(s) Oral every 8 hours PRN Temp greater or equal to 38.5C (101.3F)  ondansetron Injectable 4 milliGRAM(s) IV Push every 6 hours PRN Nausea and/or Vomiting      __________________________________________________  REVIEW OF SYSTEMS:    CONSTITUTIONAL: No fever,   EYES: no acute visual disturbances  NECK: No pain or stiffness  RESPIRATORY: No cough; No shortness of breath  CARDIOVASCULAR: No chest pain, no palpitations  GASTROINTESTINAL: No pain. No nausea or vomiting; No diarrhea   NEUROLOGICAL: No headache or numbness, no tremors  MUSCULOSKELETAL: No joint pain, no muscle pain  GENITOURINARY: no dysuria, no frequency, no hesitancy  PSYCHIATRY: no depression , no anxiety  ALL OTHER  ROS negative        Vital Signs Last 24 Hrs  T(C): 36.7 (03 Aug 2020 13:22), Max: 37.1 (02 Aug 2020 15:30)  T(F): 98 (03 Aug 2020 13:22), Max: 98.7 (02 Aug 2020 15:30)  HR: 89 (03 Aug 2020 13:22) (89 - 109)  BP: 96/63 (03 Aug 2020 13:22) (92/49 - 111/64)  BP(mean): --  RR: 17 (03 Aug 2020 13:22) (16 - 17)  SpO2: 100% (03 Aug 2020 13:22) (99% - 100%)    ________________________________________________  PHYSICAL EXAM:  GENERAL: NAD, conversant  HEENT: Normocephalic;  conjunctivae and sclerae clear; moist mucous membranes;   NECK : supple  CHEST/LUNG: Clear to auscultation bilaterally with good air entry   HEART: S1 S2  regular; no murmurs, gallops or rubs  ABDOMEN: Soft, Nontender, Nondistended; Bowel sounds present  EXTREMITIES: no cyanosis; no edema; no calf tenderness  SKIN: warm and dry; no rash  NERVOUS SYSTEM:  Awake and alert; Oriented  to place, person and time ; no new deficits    _________________________________________________  LABS:                        8.4    8.32  )-----------( 342      ( 03 Aug 2020 06:54 )             27.5     08-03    137  |  106  |  5<L>  ----------------------------<  71  3.7   |  19<L>  |  0.45<L>    Ca    8.3<L>      03 Aug 2020 06:54  Phos  2.8     08-02  Mg     2.0     08-02    TPro  6.3  /  Alb  2.3<L>  /  TBili  0.5  /  DBili  x   /  AST  43<H>  /  ALT  53  /  AlkPhos  160<H>  08-02        CAPILLARY BLOOD GLUCOSE            RADIOLOGY & ADDITIONAL TESTS:    Imaging  Reviewed:  YES  < from: NM Hepatobiliary Imaging (08.03.20 @ 12:42) >    EXAM:  NM HEPATOBILIARY IMG                            PROCEDURE DATE:  08/03/2020          INTERPRETATION:  CLINICAL INFORMATION: 24-year-old female, with right upper quadrant pain, gallbladder and pericholecystic fluid, evaluate for acute cholecystitis.    RADIOPHARMACEUTICAL: 3 mCi Tc-99m-Mebrofenin, I.V.    TECHNIQUE: Dynamic imaging of the anterior abdomen was performed for 60 minutes following injection of radiotracer. Static images of the abdomen in the anterior, right lateral and right anterior oblique views were obtained immediately thereafter.    COMPARISON:  No prior hepatobiliary scan is available for comparison.    FINDINGS: There is prompt, homogeneous uptake of radiotracer by the hepatocytes. Activity is first seen in the gallbladder at 10 minutes and in the bowel at 30 minutes. There is good clearance of activity from the liver by the end of the study.    IMPRESSION: Normal hepatobiliary scan.    No radionuclide evidence of acute cholecystitis.                      FAB ROSE, NUCLEAR MEDICINE ATTENDING  This document has been electronically signed. Aug  3 2020  1:45PM                < end of copied text >          < from: Xray Chest 2 Views PA/Lat (08.01.20 @ 15:10) >    EXAM:  XR CHEST PA LAT 2V                            PROCEDURE DATE:  08/01/2020          INTERPRETATION:  CLINICAL INFORMATION: Sepsis.    A PA/Lateral view of the chest was obtained.    Comparison: None.    IMPRESSION:    The mediastinal cardiac silhouette is unremarkable.    The lungs are clear.    No acute osseous finding.                  JAVI RODRIGUEZ M.D., ATTENDING RADIOLOGIST  This document has been electronically signed. Aug  1 2020  3:14PM                < end of copied text >  < from: CT Abdomen and Pelvis w/ IV Cont (08.01.20 @ 16:04) >    EXAM:  CT ABDOMEN AND PELVIS IC                            PROCEDURE DATE:  08/01/2020          INTERPRETATION:  CLINICAL INFORMATION: Right abdominal pain, fever, dysuria. Question appendicitis    COMPARISON: None.    PROCEDURE:  CT of the Abdomenand Pelvis was performed with intravenous contrast.  Intravenous contrast: 90 ml Omnipaque 350. 10 ml discarded.  Oral contrast: None.  Sagittal and coronal reformats were performed.    FINDINGS:  LOWER CHEST: Within normal limits.    LIVER: Diffuse periportal edema.  BILE DUCTS: Normal caliber.  GALLBLADDER: Bladder wall thickening and or pericholecystic fluid.  SPLEEN: Within normal limits.  PANCREAS: Within normal limits.  ADRENALS: Within normal limits.  KIDNEYS/URETERS: Right kidney demonstrates numerous wedge-shaped areas of hypoenhancement. This could represent infection versus infarction. Correlate clinically.    BLADDER: Within normal limits.  REPRODUCTIVE ORGANS: Uterus and adnexa within normal limits.    BOWEL: No bowel obstruction. Appendix is normal.  PERITONEUM: All simple ascites in the pelvis is likely physiologic  VESSELS: Within normal limits.  RETROPERITONEUM/LYMPH NODES: No lymphadenopathy.  ABDOMINAL WALL: Within normal limits.  BONES: Within normal limits.    IMPRESSION:    Normal appendix.    Right kidney demonstrates numerous wedge-shaped areas of hypoenhancement. This could represent infection versus infarction. Correlate clinically.    Diffuse periportal edema. Pericholecystic fluid and/or gallbladder wall thickening.              JAVI RODRIGUEZ M.D., ATTENDING RADIOLOGIST  This document has been electronically signed. Aug  1 2020  4:16PM                < end of copied text >    Consultant(s) Notes Reviewed:   YES      Plan of care was discussed with patient and /or primary care giver; all questions and concerns were addressed

## 2020-08-03 NOTE — DISCHARGE NOTE PROVIDER - HOSPITAL COURSE
Patient is a 24 year old Uze[place] speaking female with PMHx of UTI, presenting with chief complain of fever. Patient started developing fevers, cough, SOB 6 days ago. Was taking acetaminophen ATC then developed RUQ abdominal pain 4 days ago. Also with complains of "kidney pain", dizziness on exertion. Endorsed dysuria and strong urine odor. Also with nausea/vomiting, at its worst, multiple times a day, every 10 minutes. No chest pain, no recent travel, no blurry vision, constipation, diarrhea.        In the ED, was septic with hypotension, tachycardia, Tmax of 103. Received Zosyn and multiple IVF bolus. UA positive, also with Jones;s sign. CT abd with gallbladder wall thickening, pericholecystic fluid. Kidney with wedge shaped hypodensity. Pt is admitted for pyelonephritis, also concern for acute cholecystitis.     Followed by urology and surgery. s/p HIDA, neg for acute norah. Ucx shows GNR. 50,000 - 99,000 CFU/mL   ID consulted.             incomplete on 8/3 Patient is a 24 year old Uzebek speaking female with PMHx of UTI, presenting with chief complain of fever with RUQ abdominal pain 4 days  prior to admission.     In the ED, was septic with hypotension, tachycardia, Tmax of 103. Received Zosyn and multiple IVF bolus. Pt was found to have E.Coli and candidias UTI. Treated with IV ABT. Symptoms resolved. However, from CXR pleural effusion was noted, CT chest was performed which confirmed small rt pleural effusion, discussed with pulmonologist Dr. Saab, no need thoracentesis, s//p IV lasix. Seen by Uro for pyelonephritis, no need intervention  at this time. s/p HIDA which shows negative for acute cholecystitis. Pt is medically stable for discharge. Discussed with attending, advised to d/c home on P.O antibiotics as ID recommended. Patient is a 24 year old Uze[place] speaking female with PMHx of UTI, presenting with chief complain of fever with RUQ abdominal pain 4 days  prior to admission.     In the ED, was septic with hypotension, tachycardia, Tmax of 103. Received Zosyn and multiple IVF bolus. Pt was found to have E.Coli and candidias UTI. Treated with IV ABT. Symptoms resolved. However, from CXR pleural effusion was noted, CT chest was performed which confirmed small rt pleural effusion, discussed with pulmonologist Dr. Saab, no need thoracentesis, one time dose of lasix was recommended however, pt baseline BP is running low and has no symptoms so no lasix per PMD.  Seen by Uro for pyelonephritis, no need intervention  at this time. s/p HIDA which shows negative for acute cholecystitis. Pt is medically stable for discharge. Discussed with attending, advised to d/c home on P.O antibiotics as ID recommended.

## 2020-08-03 NOTE — PROGRESS NOTE ADULT - ASSESSMENT
24 year old female with no PMHx presented with sepsis likely due to pyelonephritis, also with gall bladder findings. To be admitted for further treatment of pyelonephritis.
24 yoF with pyelonephritis vs infarct    no leukocytosis, afeb vss  on zosyn   blood cx no growth UTD, UA pos, no Ucx result  Cr wnl, UO not recorded     - cont abx  - no urologic intervention warranted   - continue plan per primary team
24F with renal infarcts vs. pyelonephritis, +UA  currently afebrile, no leukocytosis  H/H downtrending    - monitor H/H, anemia workup  - continue IVF/ IV abx for +UTI  - consider GI eval  - continue care as per primary  - no urologic intervention warranted at this time  - will follow
24 year old female with no PMHx presented with sepsis likely due to pyelonephritis, also with gall bladder findings. To be admitted for further treatment of pyelonephritis.     Problem/Plan - 1:  ·  Problem: Pyelonephritis.  Plan:   Problem/Plan - 2:  ·  Problem: Acute acalculous cholecystitis.  Plan:   Problem/Plan - 3:  ·  Problem: Microcytic anemia.  Plan:   Problem/Plan - 4:  ·  Problem: Hypokalemia.  Plan: Found to have hypokalemia 3.0 on admission. Replaced with riders/oral supplements  Follow up AM BMP.     Problem/Plan - 5:  ·  Problem: Need for prophylactic measure.  Plan: IMPROVE VTE score: 0  Will hold as patient is ambulatory 25yo with worsening anemia. Consider starting if becomes acutely ill    [ ] Previous VTE                                    3  [ ] Thrombophilia                                  2  [ ] Lower limb paralysis                        2

## 2020-08-03 NOTE — PROGRESS NOTE ADULT - PROBLEM SELECTOR PLAN 6
poss. dc home tomorrow after ID consult. Albumin 2.3  appetite is fair to good.   f/u nutritionist.   monitor CMP

## 2020-08-03 NOTE — DISCHARGE NOTE PROVIDER - NSDCCPCAREPLAN_GEN_ALL_CORE_FT
PRINCIPAL DISCHARGE DIAGNOSIS  Diagnosis: Pyelonephritis  Assessment and Plan of Treatment:       SECONDARY DISCHARGE DIAGNOSES  Diagnosis: Vitamin D deficiency  Assessment and Plan of Treatment: your vitamin D level is low. started supplement.   Follow up with primiary doctor for repeat blood test in 8 wks.    Diagnosis: Hypoalbuminemia  Assessment and Plan of Treatment: You blood albumin level is relatively low. low albumin levels can also be seen in inflammation, shock, or malnutrition.   consume high protein food in your diet.   Follow up with your doctor to repeat blood test.       Diagnosis: Elevated LFTs  Assessment and Plan of Treatment: Your liver enzyme slightly elevated.    Diagnosis: Acute acalculous cholecystitis  Assessment and Plan of Treatment: Acute acalculous cholecystitis    Diagnosis: Microcytic anemia  Assessment and Plan of Treatment: Microcytic anemia PRINCIPAL DISCHARGE DIAGNOSIS  Diagnosis: Pyelonephritis  Assessment and Plan of Treatment: continue to take antibiotics as prescribed   and follow up with PMd in 1-2 weeks upon discharge   If you developes fever or abdominal pain then please visit your PMD or urgent care   continue to drink enough fluids      SECONDARY DISCHARGE DIAGNOSES  Diagnosis: Vitamin D deficiency  Assessment and Plan of Treatment: your vitamin D level is low. started supplement.   Follow up with primiary doctor for repeat blood test in 8 wks.    Diagnosis: Hypoalbuminemia  Assessment and Plan of Treatment: You blood albumin level is relatively low. low albumin levels can also be seen in inflammation, shock, or malnutrition.   consume high protein food in your diet.   Follow up with your doctor to repeat blood test.       Diagnosis: Microcytic anemia  Assessment and Plan of Treatment: Both hemoglobin (Hgb) and hematocrit values are used to define anemia. These lab values are obtained from a complete blood count (CBC) test. This is performed at a caregiver's office  Iron is an essential part of hemoglobin. Without enough hemoglobin, anemia develops and the body does not get the right amount of oxygen. Iron deficiency anemia develops after the body has had a low level of iron for a long time. This is either caused by blood loss, not taking in or absorbing enough iron, or increased demands for iron (like pregnancy or rapid growth).   Foods from animal origin such as beef, chicken, and pork, are good sources of iron. Be sure to have one of these foods at each meal. Vitamin C helps your body absorb iron. Foods rich in Vitamin C include citrus, bell pepper, strawberries, spinach and cantaloupe. In some cases, iron supplements may be needed in order to correct the iron deficiency. In the case of poor absorption, extra iron may have to be given directly into the vein through a needle (intravenously)      Diagnosis: Pleural effusion  Assessment and Plan of Treatment: You got intravenous diuretic to treat this pleural effusion which was found form chest xray and CT scan  follow up with primary phisician and have follow up chest xray    Diagnosis: Elevated LFTs  Assessment and Plan of Treatment: resolved   follow up with primary physician to have follow up blood work PRINCIPAL DISCHARGE DIAGNOSIS  Diagnosis: Pyelonephritis  Assessment and Plan of Treatment: continue to take antibiotics as prescribed -- ciprofloxacin 500mg two times daily for 10 more days, complete the course.   and follow up with PMd in 1-2 weeks upon discharge   If you developes fever or abdominal pain then please visit your PMD or urgent care   continue to drink enough fluids      SECONDARY DISCHARGE DIAGNOSES  Diagnosis: Vitamin D deficiency  Assessment and Plan of Treatment: your vitamin D level is low. started supplement.   Follow up with primiary doctor for repeat blood test in 8 wks.    Diagnosis: Hypoalbuminemia  Assessment and Plan of Treatment: You blood albumin level is relatively low. low albumin levels can also be seen in inflammation, shock, or malnutrition.   consume high protein food in your diet.   Follow up with your doctor to repeat blood test.       Diagnosis: Microcytic anemia  Assessment and Plan of Treatment: Both hemoglobin (Hgb) and hematocrit values are used to define anemia. These lab values are obtained from a complete blood count (CBC) test. This is performed at a caregiver's office  Iron is an essential part of hemoglobin. Without enough hemoglobin, anemia develops and the body does not get the right amount of oxygen. Iron deficiency anemia develops after the body has had a low level of iron for a long time. This is either caused by blood loss, not taking in or absorbing enough iron, or increased demands for iron (like pregnancy or rapid growth).   Foods from animal origin such as beef, chicken, and pork, are good sources of iron. Be sure to have one of these foods at each meal. Vitamin C helps your body absorb iron. Foods rich in Vitamin C include citrus, bell pepper, strawberries, spinach and cantaloupe. In some cases, iron supplements may be needed in order to correct the iron deficiency. In the case of poor absorption, extra iron may have to be given directly into the vein through a needle (intravenously)      Diagnosis: Pleural effusion  Assessment and Plan of Treatment: found form chest xray and CT scan, you had no symptoms during this admission.   follow up with primary phisician and have follow up chest xray    Diagnosis: Elevated LFTs  Assessment and Plan of Treatment: resolved   follow up with primary physician to have follow up blood work

## 2020-08-03 NOTE — DISCHARGE NOTE PROVIDER - NSDCMRMEDTOKEN_GEN_ALL_CORE_FT
docusate sodium 100 mg oral capsule: 1 cap(s) orally 2 times a day, As needed, For stool softening  ferrous sulfate 325 mg (65 mg elemental iron) oral tablet: 1 tab(s) orally 2 times a day   ibuprofen 600 mg oral tablet: 1 tab(s) orally every 6 hours, As needed, Pain   Prenatal Multivitamins with Folic Acid 1 mg oral tablet: 1 tab(s) orally once a day  Vitamin C 500 mg oral tablet, chewable: 1 tab(s) chewed once a day Cipro 500 mg oral tablet: 1 tab(s) orally 2 times a day   docusate sodium 100 mg oral capsule: 1 cap(s) orally 2 times a day, As needed, For stool softening  ergocalciferol 50,000 intl units (1.25 mg) oral capsule: 1 cap(s) orally once a week  ferrous sulfate 325 mg (65 mg elemental iron) oral tablet: 1 tab(s) orally 2 times a day   Prenatal Multivitamins with Folic Acid 1 mg oral tablet: 1 tab(s) orally once a day  Vitamin C 500 mg oral tablet, chewable: 1 tab(s) chewed once a day

## 2020-08-03 NOTE — PROGRESS NOTE ADULT - PROBLEM SELECTOR PLAN 2
With whatley's sign, imaging findings of gallbladder wall thickening, pericholecystic fluid  - Started on antibiotics, Zosyn which will cover pyelonephritis/intraabd infection  - Likely will need cholecystectomy, however as per surgery, does not believe to be cholecystitis as no stones, no WBC. However does have transaminitis.  - Will obtain HIDA scan to r/o cholecystitis.  - Follow up surgery regarding acute cholecystitis. + whatley's sign, imaging findings of gallbladder wall thickening, pericholecystic fluid  started Zosyn which will cover pyelonephritis/intraabd infection  As per surgery, not likely cholecystitis as no stones, no WBC. However does have transaminitis.  HIDA scan  neg for cholecystitis.

## 2020-08-04 DIAGNOSIS — J90 PLEURAL EFFUSION, NOT ELSEWHERE CLASSIFIED: ICD-10-CM

## 2020-08-04 LAB
-  AMIKACIN: SIGNIFICANT CHANGE UP
-  AMOXICILLIN/CLAVULANIC ACID: SIGNIFICANT CHANGE UP
-  AMPICILLIN/SULBACTAM: SIGNIFICANT CHANGE UP
-  AMPICILLIN: SIGNIFICANT CHANGE UP
-  AZTREONAM: SIGNIFICANT CHANGE UP
-  CEFAZOLIN: SIGNIFICANT CHANGE UP
-  CEFEPIME: SIGNIFICANT CHANGE UP
-  CEFOXITIN: SIGNIFICANT CHANGE UP
-  CEFTRIAXONE: SIGNIFICANT CHANGE UP
-  CIPROFLOXACIN: SIGNIFICANT CHANGE UP
-  ERTAPENEM: SIGNIFICANT CHANGE UP
-  GENTAMICIN: SIGNIFICANT CHANGE UP
-  IMIPENEM: SIGNIFICANT CHANGE UP
-  LEVOFLOXACIN: SIGNIFICANT CHANGE UP
-  MEROPENEM: SIGNIFICANT CHANGE UP
-  NITROFURANTOIN: SIGNIFICANT CHANGE UP
-  PIPERACILLIN/TAZOBACTAM: SIGNIFICANT CHANGE UP
-  TIGECYCLINE: SIGNIFICANT CHANGE UP
-  TOBRAMYCIN: SIGNIFICANT CHANGE UP
-  TRIMETHOPRIM/SULFAMETHOXAZOLE: SIGNIFICANT CHANGE UP
ALBUMIN SERPL ELPH-MCNC: 2.5 G/DL — LOW (ref 3.5–5)
ALP SERPL-CCNC: 147 U/L — HIGH (ref 40–120)
ALT FLD-CCNC: 59 U/L DA — SIGNIFICANT CHANGE UP (ref 10–60)
ANION GAP SERPL CALC-SCNC: 10 MMOL/L — SIGNIFICANT CHANGE UP (ref 5–17)
AST SERPL-CCNC: 38 U/L — SIGNIFICANT CHANGE UP (ref 10–40)
BILIRUB SERPL-MCNC: 0.4 MG/DL — SIGNIFICANT CHANGE UP (ref 0.2–1.2)
BUN SERPL-MCNC: 6 MG/DL — LOW (ref 7–18)
CALCIUM SERPL-MCNC: 8.5 MG/DL — SIGNIFICANT CHANGE UP (ref 8.4–10.5)
CHLORIDE SERPL-SCNC: 103 MMOL/L — SIGNIFICANT CHANGE UP (ref 96–108)
CO2 SERPL-SCNC: 24 MMOL/L — SIGNIFICANT CHANGE UP (ref 22–31)
CREAT SERPL-MCNC: 0.41 MG/DL — LOW (ref 0.5–1.3)
CULTURE RESULTS: SIGNIFICANT CHANGE UP
GLUCOSE SERPL-MCNC: 91 MG/DL — SIGNIFICANT CHANGE UP (ref 70–99)
HCT VFR BLD CALC: 30.3 % — LOW (ref 34.5–45)
HGB BLD-MCNC: 9.4 G/DL — LOW (ref 11.5–15.5)
MCHC RBC-ENTMCNC: 23 PG — LOW (ref 27–34)
MCHC RBC-ENTMCNC: 31 GM/DL — LOW (ref 32–36)
MCV RBC AUTO: 74.1 FL — LOW (ref 80–100)
METHOD TYPE: SIGNIFICANT CHANGE UP
NRBC # BLD: 0 /100 WBCS — SIGNIFICANT CHANGE UP (ref 0–0)
ORGANISM # SPEC MICROSCOPIC CNT: SIGNIFICANT CHANGE UP
ORGANISM # SPEC MICROSCOPIC CNT: SIGNIFICANT CHANGE UP
PLATELET # BLD AUTO: 501 K/UL — HIGH (ref 150–400)
POTASSIUM SERPL-MCNC: 3.4 MMOL/L — LOW (ref 3.5–5.3)
POTASSIUM SERPL-SCNC: 3.4 MMOL/L — LOW (ref 3.5–5.3)
PROT SERPL-MCNC: 7.2 G/DL — SIGNIFICANT CHANGE UP (ref 6–8.3)
RBC # BLD: 4.09 M/UL — SIGNIFICANT CHANGE UP (ref 3.8–5.2)
RBC # FLD: 15.2 % — HIGH (ref 10.3–14.5)
SODIUM SERPL-SCNC: 137 MMOL/L — SIGNIFICANT CHANGE UP (ref 135–145)
SPECIMEN SOURCE: SIGNIFICANT CHANGE UP
WBC # BLD: 5.34 K/UL — SIGNIFICANT CHANGE UP (ref 3.8–10.5)
WBC # FLD AUTO: 5.34 K/UL — SIGNIFICANT CHANGE UP (ref 3.8–10.5)

## 2020-08-04 PROCEDURE — 71250 CT THORAX DX C-: CPT | Mod: 26

## 2020-08-04 PROCEDURE — 71046 X-RAY EXAM CHEST 2 VIEWS: CPT | Mod: 26

## 2020-08-04 RX ORDER — FLUCONAZOLE 150 MG/1
100 TABLET ORAL ONCE
Refills: 0 | Status: COMPLETED | OUTPATIENT
Start: 2020-08-04 | End: 2020-08-04

## 2020-08-04 RX ORDER — FLUCONAZOLE 150 MG/1
100 TABLET ORAL EVERY 24 HOURS
Refills: 0 | Status: DISCONTINUED | OUTPATIENT
Start: 2020-08-05 | End: 2020-08-05

## 2020-08-04 RX ORDER — HEPARIN SODIUM 5000 [USP'U]/ML
5000 INJECTION INTRAVENOUS; SUBCUTANEOUS EVERY 12 HOURS
Refills: 0 | Status: DISCONTINUED | OUTPATIENT
Start: 2020-08-04 | End: 2020-08-05

## 2020-08-04 RX ORDER — FLUCONAZOLE 150 MG/1
TABLET ORAL
Refills: 0 | Status: DISCONTINUED | OUTPATIENT
Start: 2020-08-04 | End: 2020-08-05

## 2020-08-04 RX ORDER — POTASSIUM CHLORIDE 20 MEQ
40 PACKET (EA) ORAL ONCE
Refills: 0 | Status: COMPLETED | OUTPATIENT
Start: 2020-08-04 | End: 2020-08-04

## 2020-08-04 RX ADMIN — PIPERACILLIN AND TAZOBACTAM 25 GRAM(S): 4; .5 INJECTION, POWDER, LYOPHILIZED, FOR SOLUTION INTRAVENOUS at 13:50

## 2020-08-04 RX ADMIN — PIPERACILLIN AND TAZOBACTAM 25 GRAM(S): 4; .5 INJECTION, POWDER, LYOPHILIZED, FOR SOLUTION INTRAVENOUS at 05:59

## 2020-08-04 RX ADMIN — ERGOCALCIFEROL 50000 UNIT(S): 1.25 CAPSULE ORAL at 05:59

## 2020-08-04 RX ADMIN — HEPARIN SODIUM 5000 UNIT(S): 5000 INJECTION INTRAVENOUS; SUBCUTANEOUS at 17:58

## 2020-08-04 RX ADMIN — Medication 40 MILLIEQUIVALENT(S): at 11:36

## 2020-08-04 RX ADMIN — PIPERACILLIN AND TAZOBACTAM 25 GRAM(S): 4; .5 INJECTION, POWDER, LYOPHILIZED, FOR SOLUTION INTRAVENOUS at 22:07

## 2020-08-04 RX ADMIN — FLUCONAZOLE 50 MILLIGRAM(S): 150 TABLET ORAL at 11:36

## 2020-08-04 NOTE — PROGRESS NOTE ADULT - ATTENDING COMMENTS
Pt seen at bedside  Chart reviewed  Discussed with MAR      24 year old woman seen on behalf of Dr Lipscomb with 1 week of fever and right sided abdominal pain.   No prior episodes.    Vital Signs Last 24 Hrs  T(C): 39.4 (01 Aug 2020 23:50), Max: 39.6 (01 Aug 2020 12:33)  T(F): 103 (01 Aug 2020 23:50), Max: 103.2 (01 Aug 2020 12:33)  HR: 89 (01 Aug 2020 22:39) (89 - 101)  BP: 114/68 (01 Aug 2020 22:39) (95/60 - 114/68)  RR: 16 (01 Aug 2020 22:39) (16 - 18)  SpO2: 97% (01 Aug 2020 22:39) (97% - 100%)    Young woman, NAD presently, sleeping in bed  CTA B/l RRR S1S2  Soft TTP in both RUQ , epigastric and RLQ, no rebound  No pedal edema  No focal deficits      Labs                        9.3    5.05  )-----------( 296      ( 01 Aug 2020 13:44 )             30.6     PT/INR - ( 01 Aug 2020 13:44 )   PT: 14.1 sec;   INR: 1.22 ratio    PTT - ( 01 Aug 2020 13:44 )  PTT:35.6 sec        137  |  103  |  5<L>  ----------------------------<  104<H>  3.0<L>   |  26  |  0.55    Ca    8.2<L>      01 Aug 2020 13:44    TPro  7.4  /  Alb  2.9<L>  /  TBili  0.5  /  DBili  x   /  AST  37  /  ALT  56  /  AlkPhos  161<H>      Urinalysis Basic - ( 01 Aug 2020 14:32 )  Color: Yellow / Appearance: Clear / S.005 / pH: x  Gluc: x / Ketone: Negative  / Bili: Negative / Urobili: 1   Blood: x / Protein: 30 mg/dL / Nitrite: Positive   Leuk Esterase: Small / RBC: 2-5 /HPF / WBC >50 /HPF   Sq Epi: x / Non Sq Epi: Moderate /HPF / Bacteria: Many /HPF    COVID-19 PCR: NotDetec (01 Aug 2020 14:20)    Abd CT  LIVER: Diffuse periportal edema.  BILE DUCTS: Normal caliber.  GALLBLADDER: Bladder wall thickening and or pericholecystic fluid.  KIDNEYS/URETERS: Right kidney demonstrates numerous wedge-shaped areas of hypoenhancement. This could represent infection versus infarction.     Impression  Young woman with 1 week of right sided abdominal pain and fever with imaging study concerning for acute cholecystitis and suspicion for right kidney multiple wedge shaped infarction and UTI. She also has anemia which appears to be chronic iron deficiency induced. Hx of menorrhagia.    A/P  - Acute cholecystitis  - UTI +/- right sided pyelonephritis  - Right kidney wedge shaped area of hypoenhancement concerning for infarction  - Hypokalemia  -Hepatomegaly   - Suspected Iron deficiency anemia    Plan  - Admit to medicine  - Pain control  - IVF hydration  -  Empiric IV antibiotics; zosyn 3.375g q 8 hourly  - F/up sepsis work up  - Concern for infarction are low in this 24 year old woman with no predisposing risk factors; will suggest repeat imaging   - Correct electrolyte deficits; check Mg, PO4  - General and urologic surgery consultations requested  - check iron panel; FOBT, Pelvic ultrasound with GYN consultation  - Unsure if hepatomegaly is acute in the setting of periportal inflammation; Will consider post management imaging to re-evaluate size;
Patient seen and examined.   T(C): 36.3 (08-04-20 @ 16:01), Max: 36.3 (08-04-20 @ 16:01)  HR: 79 (08-04-20 @ 16:01) (79 - 79)  BP: 91/53 (08-04-20 @ 16:01) (91/53 - 91/53)  RR: 16 (08-04-20 @ 16:01) (16 - 16)  SpO2: 100% (08-04-20 @ 16:01) (100% - 100%)    Sepsis   Acute pyelonephritis- urine cultures show Yeast and E.coli, follow up specification.   Continue with Zosyn, add Diflucan.   Cxr Rt pleural effusion, follow up CT chest.   Plan discussed with patient.
Patient seen and examined.   24 year old Uzebek speaking female with PMHx of UTI, presenting with chief complain of fever.     T(C): 36.8 (08-03-20 @ 15:46), Max: 36.8 (08-03-20 @ 15:46)  HR: 67 (08-03-20 @ 15:46) (67 - 89)  BP: 109/56 (08-03-20 @ 15:46) (96/63 - 109/56)  RR: 17 (08-03-20 @ 15:46) (17 - 17)  SpO2: 100% (08-03-20 @ 15:46) (100% - 100%)    Sepsis  Hypotension  Acute Pyelonephritis  Cholecystitis ruled out     Continue with Zosyn. Follow up cultures. Advance diet as tolerated.

## 2020-08-04 NOTE — PROGRESS NOTE ADULT - PROBLEM SELECTOR PLAN 2
c/o cough  CXR clear previously  repeat CXR shows Right pleural effusion.   Pulmonary dr. pritchett following  ordered chest CT w/o oral contrast per attending.

## 2020-08-04 NOTE — PROGRESS NOTE ADULT - PROBLEM SELECTOR PLAN 5
Noted with anemia with hgb of 9.3 on admission. Downtrending after fluids  Iron level low.   may f/u outpatient Gyn  if menorrhagia, although patient denied excessive blood loss.   f/u FOBT

## 2020-08-04 NOTE — PROGRESS NOTE ADULT - PROBLEM SELECTOR PLAN 3
+ whatley's sign, imaging findings of gallbladder wall thickening, pericholecystic fluid  started Zosyn which will cover pyelonephritis/intraabd infection  As per surgery, not likely cholecystitis as no stones, no WBC. However does have transaminitis.  HIDA scan  neg for cholecystitis.
Noted with anemia with hgb of 9.3. Downtrending after fluids  - Follow up iron studies, ferritin, iron, TIBC  - Consider Gyn consult if menorrhagia, although patient denied excessive blood loss
ALT 53, AST 43, Alk Phos 160. T bili normal  neg for hepatitis  abd CT and US as above  monitor LFTs

## 2020-08-04 NOTE — PROGRESS NOTE ADULT - PROBLEM SELECTOR PLAN 4
ALT 53, AST 43, Alk Phos 160. T bili normal  improving LFT.   neg for hepatitis  abd CT and US as above  monitor LFTs
Found to have hypokalemia 3.0 on admission. Replaced with riders/oral supplements  Follow up AM BMP
Noted with anemia with hgb of 9.3 on admission. Downtrending after fluids  Iron level low.   may f/u outpatient Gyn  if menorrhagia, although patient denied excessive blood loss.   f/u FOBT

## 2020-08-04 NOTE — PROGRESS NOTE ADULT - PROBLEM SELECTOR PLAN 1
UA positive  CT abd with findings of numerous wedge shaped densities indicating infection vs. infarct  Started on Zosyn q8hr for now.   Urine Cx shows Culture Results:   50,000 - 99,000 CFU/mL Gram Negative Rods  10,000 - 49,000 CFU/mL Yeast like cells (08.01.20 @ 19:01)  Blood cx NGTD  urology followed for wedge shaped densities in right kidney, no urologic intervention needed this time.   Id dr. Pastor consulted  Added Fluconazole

## 2020-08-04 NOTE — PROGRESS NOTE ADULT - PROBLEM SELECTOR PLAN 7
Albumin 2.3  appetite is fair to good.   f/u nutritionist.   monitor CMP
DVT ppx- no chemical ppx. pt ambulatory.

## 2020-08-04 NOTE — PROGRESS NOTE ADULT - SUBJECTIVE AND OBJECTIVE BOX
NP Note discussed with  Primary Attending    Patient is a 24y old  Female who presents with a chief complaint of Fever (03 Aug 2020 18:29)      HPI- Patient is a 24 year old Uzebek speaking female with PMHx of UTI, presenting with chief complain of fever. Patient started developing fevers, cough, SOB 6 days ago. Was taking acetaminophen ATC then developed RUQ abdominal pain 4 days ago. Also with complains of "kidney pain", dizziness on exertion. Endorsed dysuria and strong urine odor. Also with nausea/vomiting, at its worst, multiple times a day, every 10 minutes. No chest pain, no recent travel, no blurry vision, constipation, diarrhea.    In the ED, was septic with hypotension, tachycardia, Tmax of 103. Received Zosyn and multiple IVF bolus. UA positive, also with Jones;s sign. CT abd with gallbladder wall thickening, pericholecystic fluid. Kidney with wedge shaped hypodensity. Pt is admitted for pyelonephritis, also concern for acute cholecystitis. Started Zosyn.   Followed by urology and surgery. s/p HIDA, neg for acute norah. Ucx shows GNR. 50,000 - 99,000 CFU/mL with yeast like organism.  ID consulted. started Fluconazole.   Pt w/ cough. repeated CXR as per pulmonary. noted pleural effusion. Ordered CT chest w/o contrast.     INTERVAL HPI/OVERNIGHT EVENTS: seen at bedside. denies coughing, abdominal pain today. Used  phone # [phone]. [facility]    MEDICATIONS  (STANDING):  ergocalciferol 88250 Unit(s) Oral <User Schedule>  fluconAZOLE IVPB      heparin   Injectable 5000 Unit(s) SubCutaneous every 12 hours  piperacillin/tazobactam IVPB.. 3.375 Gram(s) IV Intermittent every 8 hours  sodium chloride 0.9%. 1000 milliLiter(s) (100 mL/Hr) IV Continuous <Continuous>    MEDICATIONS  (PRN):  acetaminophen   Tablet .. 650 milliGRAM(s) Oral every 6 hours PRN Temp greater or equal to 38C (100.4F)  ibuprofen  Tablet. 400 milliGRAM(s) Oral every 8 hours PRN Temp greater or equal to 38.5C (101.3F)  ondansetron Injectable 4 milliGRAM(s) IV Push every 6 hours PRN Nausea and/or Vomiting      __________________________________________________  REVIEW OF SYSTEMS:    CONSTITUTIONAL: No fever,   EYES: no acute visual disturbances  NECK: No pain or stiffness  RESPIRATORY: No cough; No shortness of breath  CARDIOVASCULAR: No chest pain, no palpitations  GASTROINTESTINAL: No pain. No nausea or vomiting; No diarrhea   NEUROLOGICAL: No headache or numbness, no tremors  MUSCULOSKELETAL: No joint pain, no muscle pain  GENITOURINARY: no dysuria, no frequency, no hesitancy  PSYCHIATRY: no depression , no anxiety  ALL OTHER  ROS negative        Vital Signs Last 24 Hrs  T(C): 36.3 (04 Aug 2020 16:01), Max: 36.7 (04 Aug 2020 00:40)  T(F): 97.3 (04 Aug 2020 16:01), Max: 98 (04 Aug 2020 00:40)  HR: 79 (04 Aug 2020 16:01) (77 - 81)  BP: 91/53 (04 Aug 2020 16:01) (91/53 - 112/66)  BP(mean): --  RR: 16 (04 Aug 2020 16:01) (16 - 16)  SpO2: 100% (04 Aug 2020 16:01) (100% - 100%)    ________________________________________________  PHYSICAL EXAM:  GENERAL: NAD, conversant.   HEENT: Normocephalic;  conjunctivae and sclerae clear; moist mucous membranes;   NECK : supple  CHEST/LUNG: Clear to auscultation bilaterally with good air entry   HEART: S1 S2  regular; no murmurs, gallops or rubs  ABDOMEN: Soft, Nontender, Nondistended; Bowel sounds present  EXTREMITIES: no cyanosis; no edema; no calf tenderness  SKIN: warm and dry; no rash  NERVOUS SYSTEM:  Awake and alert; Oriented  to place, person and time ; no new deficits    _________________________________________________  LABS:                        9.4    5.34  )-----------( 501      ( 04 Aug 2020 07:12 )             30.3     08-04    137  |  103  |  6<L>  ----------------------------<  91  3.4<L>   |  24  |  0.41<L>    Ca    8.5      04 Aug 2020 07:12    TPro  7.2  /  Alb  2.5<L>  /  TBili  0.4  /  DBili  x   /  AST  38  /  ALT  59  /  AlkPhos  147<H>  08-04        CAPILLARY BLOOD GLUCOSE            RADIOLOGY & ADDITIONAL TESTS:    Imaging  Reviewed:  YES    Consultant(s) Notes Reviewed:   YES      Plan of care was discussed with patient and /or primary care giver; all questions and concerns were addressed

## 2020-08-05 VITALS — SYSTOLIC BLOOD PRESSURE: 97 MMHG | TEMPERATURE: 98 F | DIASTOLIC BLOOD PRESSURE: 58 MMHG | HEART RATE: 80 BPM

## 2020-08-05 LAB
ALBUMIN SERPL ELPH-MCNC: 2.6 G/DL — LOW (ref 3.5–5)
ALP SERPL-CCNC: 141 U/L — HIGH (ref 40–120)
ALT FLD-CCNC: 55 U/L DA — SIGNIFICANT CHANGE UP (ref 10–60)
ANION GAP SERPL CALC-SCNC: 8 MMOL/L — SIGNIFICANT CHANGE UP (ref 5–17)
AST SERPL-CCNC: 26 U/L — SIGNIFICANT CHANGE UP (ref 10–40)
BILIRUB SERPL-MCNC: 0.3 MG/DL — SIGNIFICANT CHANGE UP (ref 0.2–1.2)
BUN SERPL-MCNC: 5 MG/DL — LOW (ref 7–18)
CALCIUM SERPL-MCNC: 8.8 MG/DL — SIGNIFICANT CHANGE UP (ref 8.4–10.5)
CHLORIDE SERPL-SCNC: 105 MMOL/L — SIGNIFICANT CHANGE UP (ref 96–108)
CO2 SERPL-SCNC: 26 MMOL/L — SIGNIFICANT CHANGE UP (ref 22–31)
CREAT SERPL-MCNC: 0.46 MG/DL — LOW (ref 0.5–1.3)
GLUCOSE SERPL-MCNC: 100 MG/DL — HIGH (ref 70–99)
HCT VFR BLD CALC: 32.2 % — LOW (ref 34.5–45)
HGB BLD-MCNC: 9.9 G/DL — LOW (ref 11.5–15.5)
MCHC RBC-ENTMCNC: 23.1 PG — LOW (ref 27–34)
MCHC RBC-ENTMCNC: 30.7 GM/DL — LOW (ref 32–36)
MCV RBC AUTO: 75.2 FL — LOW (ref 80–100)
NRBC # BLD: 0 /100 WBCS — SIGNIFICANT CHANGE UP (ref 0–0)
PLATELET # BLD AUTO: 614 K/UL — HIGH (ref 150–400)
POTASSIUM SERPL-MCNC: 3.8 MMOL/L — SIGNIFICANT CHANGE UP (ref 3.5–5.3)
POTASSIUM SERPL-SCNC: 3.8 MMOL/L — SIGNIFICANT CHANGE UP (ref 3.5–5.3)
PROT SERPL-MCNC: 7.3 G/DL — SIGNIFICANT CHANGE UP (ref 6–8.3)
RBC # BLD: 4.28 M/UL — SIGNIFICANT CHANGE UP (ref 3.8–5.2)
RBC # FLD: 15.1 % — HIGH (ref 10.3–14.5)
SODIUM SERPL-SCNC: 139 MMOL/L — SIGNIFICANT CHANGE UP (ref 135–145)
WBC # BLD: 4.92 K/UL — SIGNIFICANT CHANGE UP (ref 3.8–10.5)
WBC # FLD AUTO: 4.92 K/UL — SIGNIFICANT CHANGE UP (ref 3.8–10.5)

## 2020-08-05 PROCEDURE — 96375 TX/PRO/DX INJ NEW DRUG ADDON: CPT

## 2020-08-05 PROCEDURE — 80053 COMPREHEN METABOLIC PANEL: CPT

## 2020-08-05 PROCEDURE — 84100 ASSAY OF PHOSPHORUS: CPT

## 2020-08-05 PROCEDURE — 99285 EMERGENCY DEPT VISIT HI MDM: CPT | Mod: 25

## 2020-08-05 PROCEDURE — 82977 ASSAY OF GGT: CPT

## 2020-08-05 PROCEDURE — 84443 ASSAY THYROID STIM HORMONE: CPT

## 2020-08-05 PROCEDURE — 87086 URINE CULTURE/COLONY COUNT: CPT

## 2020-08-05 PROCEDURE — 80061 LIPID PANEL: CPT

## 2020-08-05 PROCEDURE — 87186 SC STD MICRODIL/AGAR DIL: CPT

## 2020-08-05 PROCEDURE — 83690 ASSAY OF LIPASE: CPT

## 2020-08-05 PROCEDURE — 80307 DRUG TEST PRSMV CHEM ANLYZR: CPT

## 2020-08-05 PROCEDURE — 84702 CHORIONIC GONADOTROPIN TEST: CPT

## 2020-08-05 PROCEDURE — 85730 THROMBOPLASTIN TIME PARTIAL: CPT

## 2020-08-05 PROCEDURE — 82728 ASSAY OF FERRITIN: CPT

## 2020-08-05 PROCEDURE — 36415 COLL VENOUS BLD VENIPUNCTURE: CPT

## 2020-08-05 PROCEDURE — 87040 BLOOD CULTURE FOR BACTERIA: CPT

## 2020-08-05 PROCEDURE — 82306 VITAMIN D 25 HYDROXY: CPT

## 2020-08-05 PROCEDURE — 82607 VITAMIN B-12: CPT

## 2020-08-05 PROCEDURE — 96376 TX/PRO/DX INJ SAME DRUG ADON: CPT

## 2020-08-05 PROCEDURE — A9537: CPT

## 2020-08-05 PROCEDURE — 87635 SARS-COV-2 COVID-19 AMP PRB: CPT

## 2020-08-05 PROCEDURE — 93005 ELECTROCARDIOGRAM TRACING: CPT

## 2020-08-05 PROCEDURE — 71250 CT THORAX DX C-: CPT

## 2020-08-05 PROCEDURE — 83036 HEMOGLOBIN GLYCOSYLATED A1C: CPT

## 2020-08-05 PROCEDURE — 74177 CT ABD & PELVIS W/CONTRAST: CPT

## 2020-08-05 PROCEDURE — 76705 ECHO EXAM OF ABDOMEN: CPT

## 2020-08-05 PROCEDURE — 83605 ASSAY OF LACTIC ACID: CPT

## 2020-08-05 PROCEDURE — 86769 SARS-COV-2 COVID-19 ANTIBODY: CPT

## 2020-08-05 PROCEDURE — 85027 COMPLETE CBC AUTOMATED: CPT

## 2020-08-05 PROCEDURE — 71046 X-RAY EXAM CHEST 2 VIEWS: CPT

## 2020-08-05 PROCEDURE — 83735 ASSAY OF MAGNESIUM: CPT

## 2020-08-05 PROCEDURE — 85610 PROTHROMBIN TIME: CPT

## 2020-08-05 PROCEDURE — 80074 ACUTE HEPATITIS PANEL: CPT

## 2020-08-05 PROCEDURE — 78226 HEPATOBILIARY SYSTEM IMAGING: CPT

## 2020-08-05 PROCEDURE — 83540 ASSAY OF IRON: CPT

## 2020-08-05 PROCEDURE — 82746 ASSAY OF FOLIC ACID SERUM: CPT

## 2020-08-05 PROCEDURE — 96374 THER/PROPH/DIAG INJ IV PUSH: CPT

## 2020-08-05 PROCEDURE — 80048 BASIC METABOLIC PNL TOTAL CA: CPT

## 2020-08-05 PROCEDURE — 81001 URINALYSIS AUTO W/SCOPE: CPT

## 2020-08-05 PROCEDURE — 83550 IRON BINDING TEST: CPT

## 2020-08-05 RX ORDER — ERGOCALCIFEROL 1.25 MG/1
1 CAPSULE ORAL
Qty: 6 | Refills: 0
Start: 2020-08-05 | End: 2020-09-09

## 2020-08-05 RX ORDER — CIPROFLOXACIN LACTATE 400MG/40ML
1 VIAL (ML) INTRAVENOUS
Qty: 20 | Refills: 0
Start: 2020-08-05 | End: 2020-08-14

## 2020-08-05 RX ORDER — FUROSEMIDE 40 MG
40 TABLET ORAL ONCE
Refills: 0 | Status: DISCONTINUED | OUTPATIENT
Start: 2020-08-05 | End: 2020-08-05

## 2020-08-05 RX ADMIN — HEPARIN SODIUM 5000 UNIT(S): 5000 INJECTION INTRAVENOUS; SUBCUTANEOUS at 05:22

## 2020-08-05 RX ADMIN — FLUCONAZOLE 50 MILLIGRAM(S): 150 TABLET ORAL at 14:15

## 2020-08-05 RX ADMIN — PIPERACILLIN AND TAZOBACTAM 25 GRAM(S): 4; .5 INJECTION, POWDER, LYOPHILIZED, FOR SOLUTION INTRAVENOUS at 05:23

## 2020-08-05 NOTE — CONSULT NOTE ADULT - SUBJECTIVE AND OBJECTIVE BOX
HPI:  Patient is a 24 year old Uze[place] speaking female with PMHx of UTI, presenting with chief complain of fever. Patient started developing fevers, cough, SOB 6 days ago. Was taking acetaminophen ATC then developed RUQ abdominal pain 4 days ago. Also with complains of "kidney pain", dizziness on exertion. Endorsed dysuria and strong urine odor. Also with nausea/vomiting, at its worst, multiple times a day, every 10 minutes. No chest pain, no recent travel, no blurry vision, constipation, diarrhea.    In the ED, was septic with hypotension, tachycardia, Tmax of 103. Received Zosyn and multiple IVF bolus. UA positive, also with Jones;s sign. CT abd with gallbladder wall thickening, pericholecystic fluid. Kidney with wedge shaped hypodensity. (01 Aug 2020 23:57)    ID HPI:  23 yo F Tamaz[place] speaking presented with fever dysuria and malodorous urine. Patient also c/o RUQ tenderness on admission. Sepsis code was called in ED for T 103.2, hypotension and tachycardia. received vanc and zosynX1. Ct scan revealed right sided pyelonephritis vs infarction and questionable cholecystitis which later on excluded by negative HIDA scan and ultrasound for cholecystitis. Ua positive and UC was remarkable for pan sensitive Ecoli and candida albicans. Zosyn continued (day#4) with improvement in symptoms. Fluconazole added  later on. Patient currently afebrile , no leukocytosis. CXR and chest Ct remarkable for mild right sided pleural effusion. Patient denies any cough , SOB at this time. Patient endorses 8kg unintentional weight ,loss recently , albumin is low.     PAST MEDICAL & SURGICAL HISTORY:  No pertinent past medical history  No significant past surgical history      No Known Allergies      Meds:  acetaminophen   Tablet .. 650 milliGRAM(s) Oral every 6 hours PRN  ergocalciferol 91236 Unit(s) Oral <User Schedule>  fluconAZOLE IVPB 100 milliGRAM(s) IV Intermittent every 24 hours  fluconAZOLE IVPB      furosemide   Injectable 40 milliGRAM(s) IV Push once  heparin   Injectable 5000 Unit(s) SubCutaneous every 12 hours  ibuprofen  Tablet. 400 milliGRAM(s) Oral every 8 hours PRN  ondansetron Injectable 4 milliGRAM(s) IV Push every 6 hours PRN  piperacillin/tazobactam IVPB.. 3.375 Gram(s) IV Intermittent every 8 hours  sodium chloride 0.9%. 1000 milliLiter(s) IV Continuous <Continuous>      SOCIAL HISTORY:  Smoker:  NO        ETOH use:   NO                Ilicit Drug use:  NO  Occupation:  Assisted device use (Cane / Walker):  Live with:    FAMILY HISTORY:      VITALS:  Vital Signs Last 24 Hrs  T(C): 36.4 (05 Aug 2020 11:37), Max: 36.7 (05 Aug 2020 00:17)  T(F): 97.6 (05 Aug 2020 11:37), Max: 98 (05 Aug 2020 00:17)  HR: 80 (05 Aug 2020 11:37) (75 - 95)  BP: 97/58 (05 Aug 2020 11:37) (91/53 - 105/64)  BP(mean): --  RR: 16 (05 Aug 2020 08:05) (16 - 16)  SpO2: 100% (05 Aug 2020 08:05) (100% - 100%)    LABS/DIAGNOSTIC TESTS:                          9.9    4.92  )-----------( 614      ( 05 Aug 2020 06:16 )             32.2     WBC Count: 4.92 K/uL (08-05 @ 06:16)  WBC Count: 5.34 K/uL (08-04 @ 07:12)  WBC Count: 8.32 K/uL (08-03 @ 06:54)      08-05    139  |  105  |  5<L>  ----------------------------<  100<H>  3.8   |  26  |  0.46<L>    Ca    8.8      05 Aug 2020 06:16    TPro  7.3  /  Alb  2.6<L>  /  TBili  0.3  /  DBili  x   /  AST  26  /  ALT  55  /  AlkPhos  141<H>  08-05          LIVER FUNCTIONS - ( 05 Aug 2020 06:16 )  Alb: 2.6 g/dL / Pro: 7.3 g/dL / ALK PHOS: 141 U/L / ALT: 55 U/L DA / AST: 26 U/L / GGT: x                 LACTATE:    ABG -     CULTURES:   .Blood Blood-Peripheral  08-01 @ 19:02   No growth to date.  --  --      .Urine Clean Catch (Midstream)  08-01 @ 19:01   50,000 - 99,000 CFU/mL Escherichia coli  10,000 - 49,000 CFU/mL Presumptive Candida albicans Susceptibilites not  performed.  --  Escherichia coli          RADIOLOGY:      ROS  [  ] UNABLE TO ELICIT
25 y/o female denies sig PMH/PSH  c/o 6 days of fever, cough, occasional shortness of breath, right sided abdominal pain.   No vomiting/diarrhea/CP/dysuria/hematuria.  No h/o prior surgery.      < from: CT Abdomen and Pelvis w/ IV Cont (20 @ 16:04) >  IMPRESSION:    Normal appendix.    Right kidney demonstrates numerous wedge-shaped areas of hypoenhancement. This could represent infection versus infarction. Correlate clinically.    Diffuse periportal edema. Pericholecystic fluid and/or gallbladder wall thickening.      < end of copied text >  < from: US Hepatic & Pancreatic (20 @ 19:00) >  FINDINGS:    Liver: Enlarged measuring 21.1 cm with homogeneous echotexture. The portal and hepatic veins are patent. No intrahepatic biliary ductal dilatation is demonstrated.    Gallbladder: Diffuse gallbladder wall thickening measuring up to 7 mm. No cholelithiasis or pericholecystic fluid is demonstrated. A positive sonographic Jones's sign is elicited. The common bile duct is within normal limits measuring 3 mm.    Pancreas: The visualized portions are unremarkable.    Right kidney: 13.0 cm and is unremarkable.    Vascular: The visualized upper abdominal aorta measures 1.4 cm. The visualized IVC measures 1.8 cm.    Miscellaneous: Trace fluid in Morison's pouch.    IMPRESSION:    1. Diffuse gallbladder wall thickening without cholelithiasis. Differential diagnosis includes congestion, hepatitis or hypoalbuminemia and less likely cholecystitis. Correlate with physical examination and LFTs.  2. Hepatomegaly.        < end of copied text >  Vital Signs Last 24 Hrs  T(C): 36.7 (01 Aug 2020 15:20), Max: 39.6 (01 Aug 2020 12:33)  T(F): 98 (01 Aug 2020 15:20), Max: 103.2 (01 Aug 2020 12:33)  HR: 96 (01 Aug 2020 15:20) (94 - 101)  BP: 97/60 (01 Aug 2020 15:20) (95/60 - 103/78)  BP(mean): --  RR: 18 (01 Aug 2020 15:20) (16 - 18)  SpO2: 100% (01 Aug 2020 15:20) (99% - 100%)    Urinalysis Basic - ( 01 Aug 2020 14:32 )    Color: Yellow / Appearance: Clear / S.005 / pH: x  Gluc: x / Ketone: Negative  / Bili: Negative / Urobili: 1   Blood: x / Protein: 30 mg/dL / Nitrite: Positive   Leuk Esterase: Small / RBC: 2-5 /HPF / WBC >50 /HPF   Sq Epi: x / Non Sq Epi: Moderate /HPF / Bacteria: Many /HPF                            9.3    5.05  )-----------( 296      ( 01 Aug 2020 13:44 )             30.6       137  |  103  |  5<L>  ----------------------------<  104<H>  3.0<L>   |  26  |  0.55    Ca    8.2<L>      01 Aug 2020 13:44    TPro  7.4  /  Alb  2.9<L>  /  TBili  0.5  /  DBili  x   /  AST  37  /  ALT  56  /  AlkPhos  161<H>      Pt awake, alert  c/o RUQ, epigastric, R CVA tenderness  S1S2  good b/l air entry  abd soft, tender RUQ, epigastrium, no peritoneal signs  R CVA tenderness    case discussed with Dr Haynes urology  poss R kidney hypoenhancement secondary to pyelo  +UA  no leukocytosis, lactate wnl  recommend iv hydration  iv abx  serial abd exams, labs
GENERAL SURGERY CONSULTATION NOTE  Patient is a 24y old  Female who presents with a chief complaint of abdominal pain    HPI: 24F with no significant pmhx or pshx presents to the ED c/o intermittent right sided abdominal pain that has been going on for 6 days. Pt also reports intermittent fevers, dry cough and shortness of breath at times. Patient denies dizziness, changes in bowel habits, denies nausea, vomiting, dysuria, hematuria or urinary changes.    REVIEW OF SYSTEMS:  Negative except stated above in HPI    PAST MEDICAL & SURGICAL HISTORY:  No pertinent past medical history  No significant past surgical history    Allergies: No Known Allergies    Vital Signs Last 24 Hrs  T(C): 36.7 (01 Aug 2020 15:20), Max: 39.6 (01 Aug 2020 12:33)  T(F): 98 (01 Aug 2020 15:20), Max: 103.2 (01 Aug 2020 12:33)  HR: 96 (01 Aug 2020 15:20) (94 - 101)  BP: 97/60 (01 Aug 2020 15:20) (95/60 - 103/78)  RR: 18 (01 Aug 2020 15:20) (16 - 18)  SpO2: 100% (01 Aug 2020 15:20) (99% - 100%)    PHYSCAL EXAM:   General: Alert and oriented, not in acute distress  Resp: Breathing unlabored  GI: tenderness to right upper quadrant, epigastrium  Back: right sided CVA tenderness  Extremities: No pedal edema    LABS:                        9.3    5.05  )-----------( 296      ( 01 Aug 2020 13:44 )             30.6              08    137  |  103  |  5<L>  ----------------------------<  104<H>  3.0<L>   |  26  |  0.55    Ca    8.2<L>      01 Aug 2020 13:44    TPro  7.4  /  Alb  2.9<L>  /  TBili  0.5  /  DBili  x   /  AST  37  /  ALT  56  /  AlkPhos  161<H>  08  PT/INR - ( 01 Aug 2020 13:44 )   PT: 14.1 sec;   INR: 1.22 ratio    PTT - ( 01 Aug 2020 13:44 )  PTT:35.6 sec      Urinalysis Basic - ( 01 Aug 2020 14:32 )  Color: Yellow / Appearance: Clear / S.005 / pH: x  Gluc: x / Ketone: Negative  / Bili: Negative / Urobili: 1   Blood: x / Protein: 30 mg/dL / Nitrite: Positive   Leuk Esterase: Small / RBC: 2-5 /HPF / WBC >50 /HPF   Sq Epi: x / Non Sq Epi: Moderate /HPF / Bacteria: Many /HPF      RADIOLOGY & ADDITIONAL STUDIES:  < from: CT Abdomen and Pelvis w/ IV Cont (20 @ 16:04) >  PROCEDURE:  CT of the Abdomen and Pelvis was performed with intravenous contrast.  Intravenous contrast: 90 ml Omnipaque 350. 10 ml discarded.  Oral contrast: None.  Sagittal and coronal reformats were performed.  FINDINGS:  LOWER CHEST: Within normal limits.  LIVER: Diffuse periportal edema.  BILE DUCTS: Normal caliber.  GALLBLADDER: Bladder wall thickening and or pericholecystic fluid.  SPLEEN: Within normal limits.  PANCREAS: Within normal limits.  ADRENALS: Within normal limits.  KIDNEYS/URETERS: Right kidney demonstrates numerous wedge-shaped areas of hypoenhancement. This could represent infection versus infarction. Correlate clinically.  BLADDER: Within normal limits.  REPRODUCTIVE ORGANS: Uterus and adnexa within normal limits.  BOWEL: No bowel obstruction. Appendix is normal.  PERITONEUM: All simple ascites in the pelvis is likely physiologic  VESSELS: Within normal limits.  RETROPERITONEUM/LYMPH NODES: No lymphadenopathy.  ABDOMINAL WALL: Within normal limits.  BONES: Within normal limits.    IMPRESSION:  Normal appendix.  Right kidney demonstrates numerous wedge-shaped areas of hypoenhancement. This could represent infection versus infarction. Correlate clinically.  Diffuse periportal edema. Pericholecystic fluid and/or gallbladder wall thickening.  < end of copied text >        < from: US Hepatic & Pancreatic (20 @ 19:00) >  FINDINGS:  Liver: Enlarged measuring 21.1 cm with homogeneous echotexture. The portal and hepatic veins are patent. No intrahepatic biliary ductal dilatation is demonstrated.  Gallbladder: Diffuse gallbladder wall thickening measuring up to 7 mm. No cholelithiasis or pericholecystic fluid is demonstrated. A positive sonographic Jones's sign is elicited. The common bile duct is within normal limits measuring 3 mm.  Pancreas: The visualized portions are unremarkable.  Right kidney: 13.0 cm and is unremarkable.  Vascular: The visualized upper abdominal aorta measures 1.4 cm. The visualized IVC measures 1.8 cm.  Miscellaneous: Trace fluid in Morison's pouch.  IMPRESSION:  1. Diffuse gallbladder wall thickening without cholelithiasis. Differential diagnosis includes congestion, hepatitis or hypoalbuminemia and less likely cholecystitis. Correlate with physical examination and LFTs.  2. Hepatomegaly.  < end of copied text >
Time of visit:    CHIEF COMPLAINT: Patient is a 24y old  Female who presents with a chief complaint of Fever (03 Aug 2020 15:29)      HPI:  Patient is a 24 year old Uze[place] speaking female with PMHx of UTI, presenting with chief complain of fever. Patient started developing fevers, cough, SOB 6 days ago. Was taking acetaminophen ATC then developed RUQ abdominal pain 4 days ago. Also with complains of "kidney pain", dizziness on exertion. Endorsed dysuria and strong urine odor. Also with nausea/vomiting, at its worst, multiple times a day, every 10 minutes. No chest pain, no recent travel, no blurry vision, constipation, diarrhea.    In the ED, was septic with hypotension, tachycardia, Tmax of 103. Received Zosyn and multiple IVF bolus. UA positive, also with Jones;s sign. CT abd with gallbladder wall thickening, pericholecystic fluid. Kidney with wedge shaped hypodensity. (01 Aug 2020 23:57)   Patient seen and examined.     PAST MEDICAL & SURGICAL HISTORY:  No pertinent past medical history  No significant past surgical history      Allergies    No Known Allergies    Intolerances        MEDICATIONS  (STANDING):  piperacillin/tazobactam IVPB.. 3.375 Gram(s) IV Intermittent every 8 hours  sodium chloride 0.9%. 1000 milliLiter(s) (100 mL/Hr) IV Continuous <Continuous>      MEDICATIONS  (PRN):  acetaminophen   Tablet .. 650 milliGRAM(s) Oral every 6 hours PRN Temp greater or equal to 38C (100.4F)  ibuprofen  Tablet. 400 milliGRAM(s) Oral every 8 hours PRN Temp greater or equal to 38.5C (101.3F)  ondansetron Injectable 4 milliGRAM(s) IV Push every 6 hours PRN Nausea and/or Vomiting   Medications up to date at time of exam.    Medications up to date at time of exam.    FAMILY HISTORY:      SOCIAL HISTORY  Smoking History: x[   ]  none smoking/smoke exposure, [   ] former smoker  Living Condition: [   ] apartment, [   ] private house  Work History:   Travel History: denies recent travel  Illicit Substance Use: denies  Alcohol Use: denies    REVIEW OF SYSTEMS: as per  EMR    CONSTITUTIONAL:  denies fevers, chills, sweats, weight loss    HEENT:  denies diplopia or blurred vision, sore throat or runny nose.    CARDIOVASCULAR:  denies pressure, squeezing, tightness, or heaviness about the chest; no palpitations.    RESPIRATORY:  denies SOB, cough, PRESTON, wheezing.    GASTROINTESTINAL:  denies abdominal pain, nausea, vomiting or diarrhea.    GENITOURINARY: denies dysuria, frequency or urgency.    NEUROLOGIC:  denies numbness, tingling, seizures or weakness.    PSYCHIATRIC:  denies disorder of thought or mood.    MSK: denies swelling, redness      PHYSICAL EXAMINATION:    GENERAL: The patient is a well-developed, well-nourished, in no apparent distress.     Vital Signs Last 24 Hrs  T(C): 36.8 (03 Aug 2020 15:46), Max: 36.8 (03 Aug 2020 15:46)  T(F): 98.3 (03 Aug 2020 15:46), Max: 98.3 (03 Aug 2020 15:46)  HR: 67 (03 Aug 2020 15:46) (67 - 92)  BP: 109/56 (03 Aug 2020 15:46) (92/49 - 109/56)  BP(mean): --  RR: 17 (03 Aug 2020 15:46) (16 - 17)  SpO2: 100% (03 Aug 2020 15:46) (99% - 100%)   (if applicable)    Chest Tube (if applicable)    HEENT: Head is normocephalic and atraumatic. Extraocular muscles are intact. Mucous membranes are moist.     NECK: Supple, no palpable adenopathy.    LUNGS: Clear to auscultation, no wheezing, rales, or rhonchi.    HEART: Regular rate and rhythm without murmur.    ABDOMEN: Soft, nontender, and nondistended.  No hepatosplenomegaly is noted.    RENAL: No difficulty voiding, no pelvic pain    EXTREMITIES: Without any cyanosis, clubbing, rash, lesions or edema.    NEUROLOGIC: Awake, alert, oriented, grossly intact    SKIN: Warm, dry, good turgor.      LABS:                        8.4    8.32  )-----------( 342      ( 03 Aug 2020 06:54 )             27.5     08-03    137  |  106  |  5<L>  ----------------------------<  71  3.7   |  19<L>  |  0.45<L>    Ca    8.3<L>      03 Aug 2020 06:54  Phos  2.8     08-02  Mg     2.0     08-02    TPro  6.3  /  Alb  2.3<L>  /  TBili  0.5  /  DBili  x   /  AST  43<H>  /  ALT  53  /  AlkPhos  160<H>  08-02                        MICROBIOLOGY: (if applicable)    RADIOLOGY & ADDITIONAL STUDIES:  EKG:   CXR:  < from: Xray Chest 2 Views PA/Lat (08.01.20 @ 15:10) >    EXAM:  XR CHEST PA LAT 2V                            PROCEDURE DATE:  08/01/2020          INTERPRETATION:  CLINICAL INFORMATION: Sepsis.    A PA/Lateral view of the chest was obtained.    Comparison: None.    IMPRESSION:    The mediastinal cardiac silhouette is unremarkable.    The lungs are clear.    No acute osseous finding.                  JAVI RODRIGUEZ M.D., ATTENDING RADIOLOGIST  This document has been electronically signed. Aug  1 2020  3:14PM              CT abd/pelvis:< from: CT Abdomen and Pelvis w/ IV Cont (08.01.20 @ 16:04) >    EXAM:  CT ABDOMEN AND PELVIS IC                            PROCEDURE DATE:  08/01/2020          INTERPRETATION:  CLINICAL INFORMATION: Right abdominal pain, fever, dysuria. Question appendicitis    COMPARISON: None.    PROCEDURE:  CT of the Abdomenand Pelvis was performed with intravenous contrast.  Intravenous contrast: 90 ml Omnipaque 350. 10 ml discarded.  Oral contrast: None.  Sagittal and coronal reformats were performed.    FINDINGS:  LOWER CHEST: Within normal limits.    LIVER: Diffuse periportal edema.  BILE DUCTS: Normal caliber.  GALLBLADDER: Bladder wall thickening and or pericholecystic fluid.  SPLEEN: Within normal limits.  PANCREAS: Within normal limits.  ADRENALS: Within normal limits.  KIDNEYS/URETERS: Right kidney demonstrates numerous wedge-shaped areas of hypoenhancement. This could represent infection versus infarction. Correlate clinically.    BLADDER: Within normal limits.  REPRODUCTIVE ORGANS: Uterus and adnexa within normal limits.    BOWEL: No bowel obstruction. Appendix is normal.  PERITONEUM: All simple ascites in the pelvis is likely physiologic  VESSELS: Within normal limits.  RETROPERITONEUM/LYMPH NODES: No lymphadenopathy.  ABDOMINAL WALL: Within normal limits.  BONES: Within normal limits.    IMPRESSION:    Normal appendix.    Right kidney demonstrates numerous wedge-shaped areas of hypoenhancement. This could represent infection versus infarction. Correlate clinically.    Diffuse periportal edema. Pericholecystic fluid and/or gallbladder wall thickening.              JAVI RODRIGUEZ M.D., ATTENDING RADIOLOGIST  This document has been electronically signed. Aug  1 2020  4:16PM            HIDA:< from: NM Hepatobiliary Imaging (08.03.20 @ 12:42) >    EXAM:  NM HEPATOBILIARY IMG                            PROCEDURE DATE:  08/03/2020          INTERPRETATION:  CLINICAL INFORMATION: 24-year-old female, with right upper quadrant pain, gallbladder and pericholecystic fluid, evaluate for acute cholecystitis.    RADIOPHARMACEUTICAL: 3 mCi Tc-99m-Mebrofenin, I.V.    TECHNIQUE: Dynamic imaging of the anterior abdomen was performed for 60 minutes following injection of radiotracer. Static images of the abdomen in the anterior, right lateral and right anterior oblique views were obtained immediately thereafter.    COMPARISON:  No prior hepatobiliary scan is available for comparison.    FINDINGS: There is prompt, homogeneous uptake of radiotracer by the hepatocytes. Activity is first seen in the gallbladder at 10 minutes and in the bowel at 30 minutes. There is good clearance of activity from the liver by the end of the study.    IMPRESSION: Normal hepatobiliary scan.    No radionuclide evidence of acute cholecystitis.                      FAB ROSE, NUCLEAR MEDICINE ATTENDING  This document has been electronically signed. Aug  3 2020  1:45PM                < end of copied text >      ECHO:    IMPRESSION: 24y Female PAST MEDICAL & SURGICAL HISTORY:  No pertinent past medical history  No significant past surgical history   p/w             IMP: This is a 24 yr old woman , none smoker admitted of fever due to pyelonephritis. Yesterday developed a dry cough.  COVID-19 neg. HIDA neg for acute cholecystitis      Sugg:  -continue antibx for pyelo  -Dimetapp for cough   -repeat CXR  -dvt/gi prophy

## 2020-08-05 NOTE — CONSULT NOTE ADULT - GASTROINTESTINAL DETAILS
Please call pt for follow up Blood pressure with Armando Tirado for next month. Thanks. no distention/nontender/soft

## 2020-08-05 NOTE — CONSULT NOTE ADULT - ASSESSMENT
Pyelonephritis   -d/c home on ciprofloxacin 500 BID for 10 days. Pyelonephritis - improving   -d/c home on ciprofloxacin 500 BID for 10 days.

## 2020-08-05 NOTE — DISCHARGE NOTE NURSING/CASE MANAGEMENT/SOCIAL WORK - PATIENT PORTAL LINK FT
You can access the FollowMyHealth Patient Portal offered by [facility] by registering at the following website: [email or web address]. By joining "[employer]’s FollowMyHealth portal, you will also be able to view your health information using other applications (apps) compatible with our system.

## 2020-08-06 LAB
CULTURE RESULTS: SIGNIFICANT CHANGE UP
CULTURE RESULTS: SIGNIFICANT CHANGE UP
SPECIMEN SOURCE: SIGNIFICANT CHANGE UP
SPECIMEN SOURCE: SIGNIFICANT CHANGE UP

## 2022-05-31 NOTE — DISCHARGE NOTE OB - CONDITION (STATED IN TERMS THAT PERMIT A SPECIFIC MEASURABLE COMPARISON WITH CONDITION ON ADMISSION):
Male Completion Statement: After discussing his treatment course we decided to discontinue isotretinoin therapy at this time. Specific instructions were reviewed to never share medication or donate blood during and 30 days after therapy. The patient verbalizes and agrees to discontinue medication. Hypertriglyceridemia Treatment: I explained this is common when taking isotretinoin. If this worsens they will contact us. They may try OTC ibuprofen. Headache Monitoring: I recommended monitoring the headaches for now. There is no evidence of increased intracranial pressure. They were instructed to call if the headaches are worsening. Most Recent Triglycerides (Optional): acceptable Dosing Month 4 (Required For Cumulative Dosing): 40mg BID Are Labs Available For Review?: Yes Show How Many Months Of Anticipated Therapy Are Left: No Female Completion Statement: After discussing her treatment course we decided to discontinue isotretinoin therapy at this time. I again reviewed the requirements of 2 forms of contraception for 30 days after her last pill and a pregnancy test 30 days after her last pill.\\nSpecific instructions were reviewed to never share medication or donate blood during and 30 days after therapy. \\nThe patient verbalizes she agrees to discontinue medication and agrees to a post-therapy pregnancy test in 30 days. Detail Level: Zone Nosebleeds Normal Treatment: I explained this is common when taking isotretinoin. I recommended saline mist in each nostril multiple times a day. If this worsens they will contact us. Cheilitis Normal Treatment: I recommended application of Dr. Dan's Cortibalm, Vaseline, or Aquaphor numerous times a day (as often as every hour) and before going to bed. Counseling Text: I reviewed the side effect in detail. Patient should get monthly blood tests, not donate blood, not drive at night if vision affected, and not share medication. Target Cumulative Dosage (In Mg/Kg): 135 Retinoid Dermatitis Aggressive Treatment: I recommended more frequent application of Cetaphil or CeraVe to the areas of dermatitis. I also prescribed a topical steroid for twice daily use until the dermatitis resolves. Myalgia Monitoring: I explained this is common when taking isotretinoin. If this worsens they will contact us. Is Retinoid Dermatitis Present?: No - Continue Current Regimen Months Of Therapy Completed: 4 Xerosis Normal Treatment: I recommended application of Cetaphil or CeraVe numerous times a day going to bed to all dry areas. Weight Units: pounds Lower Range (In Mg/Kg): 120 Hypertriglyceridemia Monitoring: I explained this is common when taking isotretinoin. We will monitor closely. What Is The Patient's Gender: Female Cheilitis Aggressive Treatment: I recommended application of Vaseline or Aquaphor numerous times a day (as often as every hour) and before going to bed. I also prescribed a topical steroid for twice daily use. Kilograms Preamble Statement (Weight Entered In Details Tab): Reported Weight in kilograms: Use Therapeutic Ranged Or Therapeutic Target: please select Range or Target Upper Range (In Mg/Kg): 150 Xerosis Aggressive Treatment: I recommended application of Cetaphil or CeraVe numerous times a day going to bed to all dry areas. I also prescribed a topical steroid for twice daily use. Xerosis Normal Treatment: I recommended application of Cetaphil or CeraVe numerous times a day and before going to bed to all dry areas. Retinoid Dermatitis Normal Treatment: I recommended more frequent application of Cetaphil or CeraVe to the areas of dermatitis. Is Xerosis Present?: Yes - Normal Treatment Female Pregnancy Counseling Text: Female patients should also be on two forms of birth control while taking this medication and for one month after their last dose. Cheilitis Normal Treatment: I recommended application of Vaseline or Aquaphor numerous times a day (as often as every hour) and before going to bed. Pounds Preamble Statement (Weight Entered In Details Tab): Reported Weight in pounds: Xerosis Aggressive Treatment: I recommended application of Cetaphil or CeraVe numerous times a day and before going to bed to all dry areas. I also prescribed a topical steroid for twice daily use. clinically stable

## 2023-04-21 ENCOUNTER — INPATIENT (INPATIENT)
Facility: HOSPITAL | Age: 28
LOS: 1 days | Discharge: HOME | End: 2023-04-23
Attending: OBSTETRICS & GYNECOLOGY | Admitting: OBSTETRICS & GYNECOLOGY
Payer: MEDICAID

## 2023-04-21 VITALS
HEART RATE: 81 BPM | OXYGEN SATURATION: 100 % | TEMPERATURE: 98 F | DIASTOLIC BLOOD PRESSURE: 70 MMHG | RESPIRATION RATE: 16 BRPM | SYSTOLIC BLOOD PRESSURE: 124 MMHG

## 2023-04-21 DIAGNOSIS — Z3A.00 WEEKS OF GESTATION OF PREGNANCY NOT SPECIFIED: ICD-10-CM

## 2023-04-21 DIAGNOSIS — O26.899 OTHER SPECIFIED PREGNANCY RELATED CONDITIONS, UNSPECIFIED TRIMESTER: ICD-10-CM

## 2023-04-21 LAB
APTT BLD: 33.1 SEC — SIGNIFICANT CHANGE UP (ref 27.5–35.5)
BASOPHILS # BLD AUTO: 0.03 K/UL — SIGNIFICANT CHANGE UP (ref 0–0.2)
BASOPHILS NFR BLD AUTO: 0.2 % — SIGNIFICANT CHANGE UP (ref 0–2)
EOSINOPHIL # BLD AUTO: 0.09 K/UL — SIGNIFICANT CHANGE UP (ref 0–0.5)
EOSINOPHIL NFR BLD AUTO: 0.6 % — SIGNIFICANT CHANGE UP (ref 0–6)
HCT VFR BLD CALC: 42.7 % — SIGNIFICANT CHANGE UP (ref 34.5–45)
HGB BLD-MCNC: 14.1 G/DL — SIGNIFICANT CHANGE UP (ref 11.5–15.5)
IMM GRANULOCYTES NFR BLD AUTO: 0.3 % — SIGNIFICANT CHANGE UP (ref 0–0.9)
INR BLD: 0.85 RATIO — LOW (ref 0.88–1.16)
LYMPHOCYTES # BLD AUTO: 14 % — SIGNIFICANT CHANGE UP (ref 13–44)
LYMPHOCYTES # BLD AUTO: 2.19 K/UL — SIGNIFICANT CHANGE UP (ref 1–3.3)
MCHC RBC-ENTMCNC: 28.4 PG — SIGNIFICANT CHANGE UP (ref 27–34)
MCHC RBC-ENTMCNC: 33 GM/DL — SIGNIFICANT CHANGE UP (ref 32–36)
MCV RBC AUTO: 85.9 FL — SIGNIFICANT CHANGE UP (ref 80–100)
MONOCYTES # BLD AUTO: 0.87 K/UL — SIGNIFICANT CHANGE UP (ref 0–0.9)
MONOCYTES NFR BLD AUTO: 5.6 % — SIGNIFICANT CHANGE UP (ref 2–14)
NEUTROPHILS # BLD AUTO: 12.42 K/UL — HIGH (ref 1.8–7.4)
NEUTROPHILS NFR BLD AUTO: 79.3 % — HIGH (ref 43–77)
NRBC # BLD: 0 /100 WBCS — SIGNIFICANT CHANGE UP (ref 0–0)
PLATELET # BLD AUTO: 264 K/UL — SIGNIFICANT CHANGE UP (ref 150–400)
PROTHROM AB SERPL-ACNC: 10.1 SEC — LOW (ref 10.5–13.4)
RBC # BLD: 4.97 M/UL — SIGNIFICANT CHANGE UP (ref 3.8–5.2)
RBC # FLD: 13.1 % — SIGNIFICANT CHANGE UP (ref 10.3–14.5)
WBC # BLD: 15.65 K/UL — HIGH (ref 3.8–10.5)
WBC # FLD AUTO: 15.65 K/UL — HIGH (ref 3.8–10.5)

## 2023-04-21 RX ORDER — DIPHENHYDRAMINE HCL 50 MG
25 CAPSULE ORAL EVERY 6 HOURS
Refills: 0 | Status: DISCONTINUED | OUTPATIENT
Start: 2023-04-21 | End: 2023-04-23

## 2023-04-21 RX ORDER — OXYTOCIN 10 UNIT/ML
41.67 VIAL (ML) INJECTION
Qty: 20 | Refills: 0 | Status: DISCONTINUED | OUTPATIENT
Start: 2023-04-21 | End: 2023-04-21

## 2023-04-21 RX ORDER — SIMETHICONE 80 MG/1
80 TABLET, CHEWABLE ORAL EVERY 4 HOURS
Refills: 0 | Status: DISCONTINUED | OUTPATIENT
Start: 2023-04-21 | End: 2023-04-23

## 2023-04-21 RX ORDER — AER TRAVELER 0.5 G/1
1 SOLUTION RECTAL; TOPICAL EVERY 4 HOURS
Refills: 0 | Status: DISCONTINUED | OUTPATIENT
Start: 2023-04-21 | End: 2023-04-23

## 2023-04-21 RX ORDER — IBUPROFEN 200 MG
600 TABLET ORAL EVERY 6 HOURS
Refills: 0 | Status: COMPLETED | OUTPATIENT
Start: 2023-04-21 | End: 2024-03-19

## 2023-04-21 RX ORDER — HYDROCORTISONE 1 %
1 OINTMENT (GRAM) TOPICAL EVERY 6 HOURS
Refills: 0 | Status: DISCONTINUED | OUTPATIENT
Start: 2023-04-21 | End: 2023-04-23

## 2023-04-21 RX ORDER — OXYCODONE HYDROCHLORIDE 5 MG/1
5 TABLET ORAL EVERY 4 HOURS
Refills: 0 | Status: DISCONTINUED | OUTPATIENT
Start: 2023-04-21 | End: 2023-04-23

## 2023-04-21 RX ORDER — ACETAMINOPHEN 500 MG
975 TABLET ORAL
Refills: 0 | Status: DISCONTINUED | OUTPATIENT
Start: 2023-04-21 | End: 2023-04-23

## 2023-04-21 RX ORDER — BENZOCAINE 10 %
1 GEL (GRAM) MUCOUS MEMBRANE EVERY 6 HOURS
Refills: 0 | Status: DISCONTINUED | OUTPATIENT
Start: 2023-04-21 | End: 2023-04-23

## 2023-04-21 RX ORDER — MAGNESIUM HYDROXIDE 400 MG/1
30 TABLET, CHEWABLE ORAL
Refills: 0 | Status: DISCONTINUED | OUTPATIENT
Start: 2023-04-21 | End: 2023-04-23

## 2023-04-21 RX ORDER — TETANUS TOXOID, REDUCED DIPHTHERIA TOXOID AND ACELLULAR PERTUSSIS VACCINE, ADSORBED 5; 2.5; 8; 8; 2.5 [IU]/.5ML; [IU]/.5ML; UG/.5ML; UG/.5ML; UG/.5ML
0.5 SUSPENSION INTRAMUSCULAR ONCE
Refills: 0 | Status: DISCONTINUED | OUTPATIENT
Start: 2023-04-21 | End: 2023-04-23

## 2023-04-21 RX ORDER — LANOLIN
1 OINTMENT (GRAM) TOPICAL EVERY 6 HOURS
Refills: 0 | Status: DISCONTINUED | OUTPATIENT
Start: 2023-04-21 | End: 2023-04-23

## 2023-04-21 RX ORDER — KETOROLAC TROMETHAMINE 30 MG/ML
30 SYRINGE (ML) INJECTION ONCE
Refills: 0 | Status: DISCONTINUED | OUTPATIENT
Start: 2023-04-21 | End: 2023-04-21

## 2023-04-21 RX ORDER — PRAMOXINE HYDROCHLORIDE 150 MG/15G
1 AEROSOL, FOAM RECTAL EVERY 4 HOURS
Refills: 0 | Status: DISCONTINUED | OUTPATIENT
Start: 2023-04-21 | End: 2023-04-23

## 2023-04-21 RX ORDER — SODIUM CHLORIDE 9 MG/ML
3 INJECTION INTRAMUSCULAR; INTRAVENOUS; SUBCUTANEOUS EVERY 8 HOURS
Refills: 0 | Status: DISCONTINUED | OUTPATIENT
Start: 2023-04-21 | End: 2023-04-23

## 2023-04-21 RX ORDER — OXYCODONE HYDROCHLORIDE 5 MG/1
5 TABLET ORAL
Refills: 0 | Status: DISCONTINUED | OUTPATIENT
Start: 2023-04-21 | End: 2023-04-21

## 2023-04-21 RX ORDER — DIBUCAINE 1 %
1 OINTMENT (GRAM) RECTAL EVERY 6 HOURS
Refills: 0 | Status: DISCONTINUED | OUTPATIENT
Start: 2023-04-21 | End: 2023-04-23

## 2023-04-21 RX ADMIN — SODIUM CHLORIDE 3 MILLILITER(S): 9 INJECTION INTRAMUSCULAR; INTRAVENOUS; SUBCUTANEOUS at 22:06

## 2023-04-21 RX ADMIN — Medication 975 MILLIGRAM(S): at 21:05

## 2023-04-21 RX ADMIN — Medication 975 MILLIGRAM(S): at 21:35

## 2023-04-21 NOTE — PATIENT PROFILE OB - FALL HARM RISK - UNIVERSAL INTERVENTIONS
Bed in lowest position, wheels locked, appropriate side rails in place/Call bell, personal items and telephone in reach/Instruct patient to call for assistance before getting out of bed or chair/Non-slip footwear when patient is out of bed/[place] to call system/Physically safe environment - no spills, clutter or unnecessary equipment/Purposeful Proactive Rounding/Room/bathroom lighting operational, light cord in reach

## 2023-04-22 LAB
HCT VFR BLD CALC: 39.8 % — SIGNIFICANT CHANGE UP (ref 34.5–45)
HGB BLD-MCNC: 13 G/DL — SIGNIFICANT CHANGE UP (ref 11.5–15.5)
MCHC RBC-ENTMCNC: 28.8 PG — SIGNIFICANT CHANGE UP (ref 27–34)
MCHC RBC-ENTMCNC: 32.7 GM/DL — SIGNIFICANT CHANGE UP (ref 32–36)
MCV RBC AUTO: 88.1 FL — SIGNIFICANT CHANGE UP (ref 80–100)
NRBC # BLD: 0 /100 WBCS — SIGNIFICANT CHANGE UP (ref 0–0)
PLATELET # BLD AUTO: 238 K/UL — SIGNIFICANT CHANGE UP (ref 150–400)
RBC # BLD: 4.52 M/UL — SIGNIFICANT CHANGE UP (ref 3.8–5.2)
RBC # FLD: 13.1 % — SIGNIFICANT CHANGE UP (ref 10.3–14.5)
T PALLIDUM AB TITR SER: NEGATIVE — SIGNIFICANT CHANGE UP
WBC # BLD: 12.05 K/UL — HIGH (ref 3.8–10.5)
WBC # FLD AUTO: 12.05 K/UL — HIGH (ref 3.8–10.5)

## 2023-04-22 RX ORDER — IBUPROFEN 200 MG
1 TABLET ORAL
Qty: 30 | Refills: 0
Start: 2023-04-22

## 2023-04-22 RX ORDER — IBUPROFEN 200 MG
600 TABLET ORAL EVERY 6 HOURS
Refills: 0 | Status: DISCONTINUED | OUTPATIENT
Start: 2023-04-22 | End: 2023-04-23

## 2023-04-22 RX ORDER — DOCUSATE SODIUM 100 MG
1 CAPSULE ORAL
Qty: 30 | Refills: 0
Start: 2023-04-22

## 2023-04-22 RX ORDER — ACETAMINOPHEN 500 MG
3 TABLET ORAL
Qty: 30 | Refills: 0
Start: 2023-04-22

## 2023-04-22 RX ADMIN — Medication 975 MILLIGRAM(S): at 02:57

## 2023-04-22 RX ADMIN — Medication 975 MILLIGRAM(S): at 09:07

## 2023-04-22 RX ADMIN — Medication 975 MILLIGRAM(S): at 16:42

## 2023-04-22 RX ADMIN — Medication 975 MILLIGRAM(S): at 02:27

## 2023-04-22 RX ADMIN — Medication 975 MILLIGRAM(S): at 16:12

## 2023-04-22 RX ADMIN — Medication 1 TABLET(S): at 11:48

## 2023-04-22 RX ADMIN — SODIUM CHLORIDE 3 MILLILITER(S): 9 INJECTION INTRAMUSCULAR; INTRAVENOUS; SUBCUTANEOUS at 06:06

## 2023-04-22 RX ADMIN — Medication 975 MILLIGRAM(S): at 09:37

## 2023-04-22 NOTE — DISCHARGE NOTE OB - PATIENT PORTAL LINK FT
You can access the FollowMyHealth Patient Portal offered by [facility] by registering at the following website: [email or web address]. By joining [facility]’s FollowMyHealth portal, you will also be able to view your health information using other applications (apps) compatible with our system.

## 2023-04-22 NOTE — DISCHARGE NOTE OB - CARE PROVIDER_API CALL
Mervin Villanueva  OBSTETRICS AND GYNECOLOGY  [address], [address]  [place] [address]  Phone: [phone]  Fax: [phone]  Follow Up Time: 1 month

## 2023-04-22 NOTE — PROGRESS NOTE ADULT - PROBLEM SELECTOR PLAN 1
-Continue pain management  -Encourage OOB and ambulation  -Check CBC  -Continue current care  -Plan for discharge tomorrow  -d/w Dr. Santiago

## 2023-04-22 NOTE — DISCHARGE NOTE OB - NS MD DC FALL RISK RISK
For information on Fall & Injury Prevention, visit: [email or web address] OR  [email or web address] OR  https://www.cdc.gov/steadi/patient.html

## 2023-04-22 NOTE — DISCHARGE NOTE OB - MEDICATION SUMMARY - MEDICATIONS TO STOP TAKING
I will STOP taking the medications listed below when I get home from the hospital:    Cipro 500 mg oral tablet  -- 1 tab(s) by mouth 2 times a day   -- Avoid prolonged or excessive exposure to direct and/or artificial sunlight while taking this medication.  Check with your doctor before becoming pregnant.  Do not take dairy products, antacids, or iron preparations within one hour of this medication.  Finish all this medication unless otherwise directed by prescriber.  Medication should be taken with plenty of water.    ergocalciferol 50,000 intl units (1.25 mg) oral capsule  -- 1 cap(s) by mouth once a week

## 2023-04-22 NOTE — DISCHARGE NOTE OB - HOSPITAL COURSE
Patient is a 27 year old  at 39wks who presents in active labor.  S/p  and uncomplicated postpartum care.

## 2023-04-22 NOTE — DISCHARGE NOTE OB - PROVIDER TOKENS
Anesthesia Post Evaluation    Patient: Latonya Blevins    Procedure(s) Performed: Procedure(s) (LRB):  INCISION AND DRAINAGE,NECK (Left)    Final Anesthesia Type: general                        Vitals Value Taken Time   /84 07/29/22 1800   Temp 36.5 °C (97.7 °F) 07/29/22 1745   Pulse 74 07/29/22 1808   Resp 22 07/29/22 1808   SpO2 96 % 07/29/22 1808   Vitals shown include unvalidated device data.      No case tracking events are documented in the log.      Pain/Lila Score: Pain Rating Prior to Med Admin: 10 (7/29/2022 11:20 AM)  Lila Score: 9 (7/29/2022  6:00 PM)         PROVIDER:[TOKEN:[[id number]],FOLLOWUP:[1 month]]

## 2023-04-22 NOTE — PROGRESS NOTE ADULT - SUBJECTIVE AND OBJECTIVE BOX
PA NOTE:    Patient seen at bedside resting comfortably offers no current complaints. Ambulating and voiding without difficulty.  Passing flatus and tolerating regular diet.  both breast/bottle feeding . Denies HA, CP, SOB, N/V/D, dizziness, palpitations, worsening abdominal pain, worsening vaginal bleeding, or any other concerns.      Vital Signs Last 24 Hrs  T(C): 36.7 (2023 06:00), Max: 37.1 (2023 22:00)  T(F): 98 (2023 06:00), Max: 98.8 (2023 22:00)  HR: 79 (2023 06:00) (67 - 94)  BP: 107/66 (2023 06:00) (104/58 - 131/76)  BP(mean): --  RR: 18 (2023 06:00) (16 - 18)  SpO2: 98% (2023 06:00) (97% - 100%)    Parameters below as of 2023 06:00  Patient On (Oxygen Delivery Method): room air        Physical Exam:     Gen: A&Ox 3, NAD  Chest: CTA B/L  Cardiac: S1+S2; RRR  Breast: Soft, nontender, nonengorged  Abdomen: +Bs; Soft, nontender,  ND; Fundus firm below umbilicus  Gyn: mod lochia, intact/repaired  Ext: Nontender, DTRS 2+, no worsening edema    CBC Full  -  ( 2023 06:00 )  WBC Count : 12.05 K/uL  RBC Count : 4.52 M/uL  Hemoglobin : 13.0 g/dL  Hematocrit : 39.8 %  Platelet Count - Automated : 238 K/uL  Mean Cell Volume : 88.1 fl  Mean Cell Hemoglobin : 28.8 pg  Mean Cell Hemoglobin Concentration : 32.7 gm/dL  Auto Neutrophil # : x  Auto Lymphocyte # : x  Auto Monocyte # : x  Auto Eosinophil # : x  Auto Basophil # : x  Auto Neutrophil % : x  Auto Lymphocyte % : x  Auto Monocyte % : x  Auto Eosinophil % : x  Auto Basophil % : x                A/P: 26 y/o PPD#1 s/p  @ 39 weeks. pt stable    -Continue pain management  -Encourage OOB and ambulation  -Check CBC  -Continue current care  -Plan for discharge tomorrow  -d/w Dr. Villanueva     PA NOTE:    Patient seen at bedside resting comfortably offers no current complaints. Ambulating and voiding without difficulty.  Passing flatus and tolerating regular diet.  both breast/bottle feeding . Denies HA, CP, SOB, N/V/D, dizziness, palpitations, worsening abdominal pain, worsening vaginal bleeding, or any other concerns.      Vital Signs Last 24 Hrs  T(C): 36.7 (2023 06:00), Max: 37.1 (2023 22:00)  T(F): 98 (2023 06:00), Max: 98.8 (2023 22:00)  HR: 79 (2023 06:00) (67 - 94)  BP: 107/66 (2023 06:00) (104/58 - 131/76)  BP(mean): --  RR: 18 (2023 06:00) (16 - 18)  SpO2: 98% (2023 06:00) (97% - 100%)    Parameters below as of 2023 06:00  Patient On (Oxygen Delivery Method): room air        Physical Exam:     Gen: A&Ox 3, NAD  Chest: CTA B/L  Cardiac: S1+S2; RRR  Breast: Soft, nontender, nonengorged  Abdomen: +Bs; Soft, nontender,  ND; Fundus firm below umbilicus  Gyn: mod lochia, intact/repaired  Ext: Nontender, DTRS 2+, no worsening edema    CBC Full  -  ( 2023 06:00 )  WBC Count : 12.05 K/uL  RBC Count : 4.52 M/uL  Hemoglobin : 13.0 g/dL  Hematocrit : 39.8 %  Platelet Count - Automated : 238 K/uL  Mean Cell Volume : 88.1 fl  Mean Cell Hemoglobin : 28.8 pg  Mean Cell Hemoglobin Concentration : 32.7 gm/dL  Auto Neutrophil # : x  Auto Lymphocyte # : x  Auto Monocyte # : x  Auto Eosinophil # : x  Auto Basophil # : x  Auto Neutrophil % : x  Auto Lymphocyte % : x  Auto Monocyte % : x  Auto Eosinophil % : x  Auto Basophil % : x                A/P: 28 y/o PPD#1 s/p  @ 39 weeks. pt stable    -Continue pain management  -Encourage OOB and ambulation  -Check CBC  -Continue current care  -Plan for discharge tomorrow  -d/w Dr. Santiago

## 2023-04-22 NOTE — DISCHARGE NOTE OB - MEDICATION SUMMARY - MEDICATIONS TO TAKE
I will START or STAY ON the medications listed below when I get home from the hospital:    ibuprofen 600 mg oral tablet  -- 1 tab(s) by mouth every 6 hours  -- Indication: For as needed for pain    acetaminophen 325 mg oral tablet  -- 3 tab(s) by mouth every 6 hours as needed for  mild pain  -- Indication: For as needed for pain    Prenatal Multivitamins with Folic Acid 1 mg oral tablet  -- 1 tab(s) by mouth once a day  -- Indication: For breastfeeding    Colace 100 mg oral capsule  -- 1 cap(s) by mouth once a day as needed for  constipation  -- Indication: For Stool softener/constipation

## 2023-04-23 VITALS
TEMPERATURE: 98 F | RESPIRATION RATE: 18 BRPM | HEART RATE: 68 BPM | DIASTOLIC BLOOD PRESSURE: 64 MMHG | OXYGEN SATURATION: 98 % | SYSTOLIC BLOOD PRESSURE: 103 MMHG

## 2023-04-23 PROCEDURE — 85384 FIBRINOGEN ACTIVITY: CPT

## 2023-04-23 PROCEDURE — 85730 THROMBOPLASTIN TIME PARTIAL: CPT

## 2023-04-23 PROCEDURE — 85610 PROTHROMBIN TIME: CPT

## 2023-04-23 PROCEDURE — 85027 COMPLETE CBC AUTOMATED: CPT

## 2023-04-23 PROCEDURE — 86900 BLOOD TYPING SEROLOGIC ABO: CPT

## 2023-04-23 PROCEDURE — 86901 BLOOD TYPING SEROLOGIC RH(D): CPT

## 2023-04-23 PROCEDURE — 85025 COMPLETE CBC W/AUTO DIFF WBC: CPT

## 2023-04-23 PROCEDURE — 36415 COLL VENOUS BLD VENIPUNCTURE: CPT

## 2023-04-23 PROCEDURE — 59025 FETAL NON-STRESS TEST: CPT

## 2023-04-23 PROCEDURE — 59050 FETAL MONITOR W/REPORT: CPT

## 2023-04-23 PROCEDURE — 86850 RBC ANTIBODY SCREEN: CPT

## 2023-04-23 PROCEDURE — 86780 TREPONEMA PALLIDUM: CPT

## 2023-04-23 PROCEDURE — G0463: CPT

## 2023-04-23 RX ORDER — ACETAMINOPHEN 500 MG
975 TABLET ORAL EVERY 6 HOURS
Refills: 0 | Status: DISCONTINUED | OUTPATIENT
Start: 2023-04-23 | End: 2023-04-23

## 2023-04-23 RX ORDER — IBUPROFEN 200 MG
600 TABLET ORAL EVERY 6 HOURS
Refills: 0 | Status: DISCONTINUED | OUTPATIENT
Start: 2023-04-23 | End: 2023-04-23

## 2023-04-23 RX ADMIN — Medication 600 MILLIGRAM(S): at 03:43

## 2023-04-23 RX ADMIN — Medication 600 MILLIGRAM(S): at 03:13

## 2023-04-23 RX ADMIN — Medication 1 TABLET(S): at 11:35

## 2023-04-23 NOTE — PROGRESS NOTE ADULT - ASSESSMENT
A/P:  PPD#2 s/p  @39weeks , pt stable  - Discharge home with instructions  -Follow up in office in 5-6 weeks for postpartum visit  -Breastfeeding encouraged   -d/w Dr. Nieves

## 2023-04-23 NOTE — PROGRESS NOTE ADULT - SUBJECTIVE AND OBJECTIVE BOX
Patient seen at bedside resting comfortably offers no current complaints.  Ambulating and voiding without difficulty.  Passing flatus and tolerating regular diet. Pt both breast/bottle feeding.  Pt denies weakness, headache, chest pain, shortness of breath, N/V/D, dizziness, palpitations, worsening abdominal pain, worsening vaginal bleeding, or any other concerns.      Vital Signs Last 24 Hrs  T(C): 36.6 (23 Apr 2023 05:44), Max: 36.6 (22 Apr 2023 17:46)  T(F): 97.9 (23 Apr 2023 05:44), Max: 97.9 (23 Apr 2023 05:44)  HR: 68 (23 Apr 2023 05:44) (68 - 71)  BP: 103/64 (23 Apr 2023 05:44) (103/64 - 108/68)  BP(mean): --  RR: 18 (23 Apr 2023 05:44) (18 - 18)  SpO2: 98% (23 Apr 2023 05:44) (98% - 98%)    Parameters below as of 23 Apr 2023 05:44  Patient On (Oxygen Delivery Method): room air        Physical Exam:   Gen: A&Ox 3, NAD  Chest: CTA B/L  Cardiac: S1,S2; RRR  Breast: Soft, nontender, nonengorged  Abdomen: +BS; Soft, nontender, ND; Fundus firm below umbilicus  Gyn: Min lochia  Ext: Nontender, DTRS 2+, no worsening edema                          13.0   12.05 )-----------( 238      ( 22 Apr 2023 06:00 )             39.8

## 2025-02-17 ENCOUNTER — APPOINTMENT (OUTPATIENT)
Dept: RADIOLOGY | Facility: CLINIC | Age: 30
End: 2025-02-17
Payer: MEDICAID

## 2025-02-17 PROCEDURE — 73630 X-RAY EXAM OF FOOT: CPT | Mod: LT

## 2025-05-20 NOTE — PATIENT PROFILE OB - BABY A: DATE/TIME OF DELIVERY
08-May-2019 16:31 Additional Notes: Excision scar on the L upper arm is slightly raised. Discussed that it is still healing and if it is still raised at next skin check in 6 months, we can consider ILK. Render Risk Assessment In Note?: no Detail Level: Simple